# Patient Record
Sex: FEMALE | Race: BLACK OR AFRICAN AMERICAN | Employment: OTHER | URBAN - METROPOLITAN AREA
[De-identification: names, ages, dates, MRNs, and addresses within clinical notes are randomized per-mention and may not be internally consistent; named-entity substitution may affect disease eponyms.]

---

## 2017-01-01 ENCOUNTER — HOSPITAL ENCOUNTER (OUTPATIENT)
Dept: MAMMOGRAPHY | Age: 82
Discharge: HOME | End: 2017-06-22
Attending: INTERNAL MEDICINE
Payer: MEDICARE

## 2017-01-01 ENCOUNTER — HOSPITAL ENCOUNTER (OUTPATIENT)
Dept: LAB | Age: 82
Discharge: HOME | End: 2017-06-07
Payer: MEDICARE

## 2017-01-01 DIAGNOSIS — I10 HYPERTENSION, ESSENTIAL: ICD-10-CM

## 2017-01-01 DIAGNOSIS — C67.9 BLADDER CANCER (HCC): ICD-10-CM

## 2017-01-01 DIAGNOSIS — E78.00 PURE HYPERCHOLESTEROLEMIA: ICD-10-CM

## 2017-01-01 DIAGNOSIS — N18.2 CHRONIC KIDNEY DISEASE, STAGE II (MILD): ICD-10-CM

## 2017-01-01 DIAGNOSIS — Z12.31 VISIT FOR SCREENING MAMMOGRAM: ICD-10-CM

## 2017-01-01 DIAGNOSIS — I48.0 PAROXYSMAL ATRIAL FIBRILLATION (HCC): ICD-10-CM

## 2017-01-01 DIAGNOSIS — F01.53 VASCULAR DEMENTIA WITH DEPRESSED MOOD: ICD-10-CM

## 2017-01-01 LAB
ALBUMIN SERPL BCP-MCNC: 2.6 G/DL (ref 3.4–5)
ALBUMIN/GLOB SERPL: 0.6 {RATIO} (ref 0.8–1.7)
ALP SERPL-CCNC: 72 U/L (ref 45–117)
ALT SERPL-CCNC: 13 U/L (ref 13–56)
ANION GAP BLD CALC-SCNC: 10 MMOL/L (ref 3–18)
AST SERPL W P-5'-P-CCNC: 15 U/L (ref 15–37)
BASOPHILS # BLD AUTO: 0 K/UL (ref 0–0.1)
BASOPHILS # BLD: 1 % (ref 0–2)
BILIRUB SERPL-MCNC: 0.2 MG/DL (ref 0.2–1)
BUN SERPL-MCNC: 26 MG/DL (ref 7–18)
BUN/CREAT SERPL: 14 (ref 12–20)
CALCIUM SERPL-MCNC: 9 MG/DL (ref 8.5–10.1)
CHLORIDE SERPL-SCNC: 110 MMOL/L (ref 100–108)
CHOLEST SERPL-MCNC: 137 MG/DL
CO2 SERPL-SCNC: 22 MMOL/L (ref 21–32)
CREAT SERPL-MCNC: 1.85 MG/DL (ref 0.6–1.3)
DIFFERENTIAL METHOD BLD: ABNORMAL
EOSINOPHIL # BLD: 0.1 K/UL (ref 0–0.4)
EOSINOPHIL NFR BLD: 2 % (ref 0–5)
ERYTHROCYTE [DISTWIDTH] IN BLOOD BY AUTOMATED COUNT: 16.9 % (ref 11.6–14.5)
GLOBULIN SER CALC-MCNC: 4.6 G/DL (ref 2–4)
GLUCOSE SERPL-MCNC: 82 MG/DL (ref 74–99)
HCT VFR BLD AUTO: 39.5 % (ref 35–45)
HDLC SERPL-MCNC: 39 MG/DL (ref 40–60)
HDLC SERPL: 3.5 {RATIO} (ref 0–5)
HGB BLD-MCNC: 13.3 G/DL (ref 12–16)
LDLC SERPL CALC-MCNC: 65.8 MG/DL (ref 0–100)
LIPID PROFILE,FLP: ABNORMAL
LYMPHOCYTES # BLD AUTO: 48 % (ref 21–52)
LYMPHOCYTES # BLD: 3 K/UL (ref 0.9–3.6)
MCH RBC QN AUTO: 31.7 PG (ref 24–34)
MCHC RBC AUTO-ENTMCNC: 33.7 G/DL (ref 31–37)
MCV RBC AUTO: 94 FL (ref 74–97)
MONOCYTES # BLD: 0.4 K/UL (ref 0.05–1.2)
MONOCYTES NFR BLD AUTO: 7 % (ref 3–10)
NEUTS SEG # BLD: 2.6 K/UL (ref 1.8–8)
NEUTS SEG NFR BLD AUTO: 42 % (ref 40–73)
PLATELET # BLD AUTO: 326 K/UL (ref 135–420)
PMV BLD AUTO: 10.2 FL (ref 9.2–11.8)
POTASSIUM SERPL-SCNC: 4.7 MMOL/L (ref 3.5–5.5)
PROT SERPL-MCNC: 7.2 G/DL (ref 6.4–8.2)
RBC # BLD AUTO: 4.2 M/UL (ref 4.2–5.3)
SODIUM SERPL-SCNC: 142 MMOL/L (ref 136–145)
T4 SERPL-MCNC: 9.8 UG/DL (ref 4.5–10.9)
TRIGL SERPL-MCNC: 161 MG/DL (ref ?–150)
TSH SERPL DL<=0.05 MIU/L-ACNC: 1.55 UIU/ML (ref 0.36–3.74)
VLDLC SERPL CALC-MCNC: 32.2 MG/DL
WBC # BLD AUTO: 6.2 K/UL (ref 4.6–13.2)

## 2017-01-01 PROCEDURE — 85025 COMPLETE CBC W/AUTO DIFF WBC: CPT | Performed by: INTERNAL MEDICINE

## 2017-01-01 PROCEDURE — 36415 COLL VENOUS BLD VENIPUNCTURE: CPT | Performed by: INTERNAL MEDICINE

## 2017-01-01 PROCEDURE — 80061 LIPID PANEL: CPT | Performed by: INTERNAL MEDICINE

## 2017-01-01 PROCEDURE — 84443 ASSAY THYROID STIM HORMONE: CPT | Performed by: INTERNAL MEDICINE

## 2017-01-01 PROCEDURE — 80053 COMPREHEN METABOLIC PANEL: CPT | Performed by: INTERNAL MEDICINE

## 2017-01-01 PROCEDURE — 84436 ASSAY OF TOTAL THYROXINE: CPT | Performed by: INTERNAL MEDICINE

## 2017-01-01 PROCEDURE — 77067 SCR MAMMO BI INCL CAD: CPT

## 2017-05-22 ENCOUNTER — OFFICE VISIT (OUTPATIENT)
Dept: ORTHOPEDIC SURGERY | Facility: CLINIC | Age: 82
End: 2017-05-22

## 2017-05-22 VITALS
SYSTOLIC BLOOD PRESSURE: 130 MMHG | HEART RATE: 95 BPM | BODY MASS INDEX: 33.66 KG/M2 | DIASTOLIC BLOOD PRESSURE: 72 MMHG | TEMPERATURE: 97 F | HEIGHT: 65 IN | WEIGHT: 202 LBS

## 2017-05-22 DIAGNOSIS — I50.32 CHRONIC DIASTOLIC HEART FAILURE (HCC): ICD-10-CM

## 2017-05-22 DIAGNOSIS — M54.5 LOW BACK PAIN, UNSPECIFIED BACK PAIN LATERALITY, UNSPECIFIED CHRONICITY, WITH SCIATICA PRESENCE UNSPECIFIED: ICD-10-CM

## 2017-05-22 DIAGNOSIS — M25.461 KNEE EFFUSION, RIGHT: ICD-10-CM

## 2017-05-22 DIAGNOSIS — Z98.1 S/P CERVICAL SPINAL FUSION: ICD-10-CM

## 2017-05-22 DIAGNOSIS — M19.072 PRIMARY OSTEOARTHRITIS OF BOTH FEET: ICD-10-CM

## 2017-05-22 DIAGNOSIS — M25.561 PAIN IN BOTH KNEES, UNSPECIFIED CHRONICITY: ICD-10-CM

## 2017-05-22 DIAGNOSIS — M17.11 PRIMARY OSTEOARTHRITIS OF RIGHT KNEE: Primary | ICD-10-CM

## 2017-05-22 DIAGNOSIS — Z85.51 HX OF BLADDER CANCER: ICD-10-CM

## 2017-05-22 DIAGNOSIS — N18.30 CKD (CHRONIC KIDNEY DISEASE), STAGE III (HCC): ICD-10-CM

## 2017-05-22 DIAGNOSIS — R29.898 WEAKNESS OF BOTH LEGS: ICD-10-CM

## 2017-05-22 DIAGNOSIS — M25.562 PAIN IN BOTH KNEES, UNSPECIFIED CHRONICITY: ICD-10-CM

## 2017-05-22 DIAGNOSIS — G62.9 NEUROPATHY: ICD-10-CM

## 2017-05-22 DIAGNOSIS — M22.41 CHONDROMALACIA, PATELLA, RIGHT: ICD-10-CM

## 2017-05-22 DIAGNOSIS — R88.8 TURBIDITY OF BODY FLUID: ICD-10-CM

## 2017-05-22 DIAGNOSIS — M16.11 PRIMARY OSTEOARTHRITIS OF RIGHT HIP: ICD-10-CM

## 2017-05-22 DIAGNOSIS — M10.9 GOUT OF RIGHT KNEE, UNSPECIFIED CAUSE, UNSPECIFIED CHRONICITY: ICD-10-CM

## 2017-05-22 DIAGNOSIS — M17.12 PRIMARY OSTEOARTHRITIS OF LEFT KNEE: ICD-10-CM

## 2017-05-22 DIAGNOSIS — M19.071 PRIMARY OSTEOARTHRITIS OF BOTH FEET: ICD-10-CM

## 2017-05-22 RX ORDER — BETAMETHASONE SODIUM PHOSPHATE AND BETAMETHASONE ACETATE 3; 3 MG/ML; MG/ML
3 INJECTION, SUSPENSION INTRA-ARTICULAR; INTRALESIONAL; INTRAMUSCULAR; SOFT TISSUE ONCE
Qty: 0.5 ML | Refills: 0
Start: 2017-05-22 | End: 2017-05-22

## 2017-05-22 RX ORDER — HYDROCODONE BITARTRATE AND ACETAMINOPHEN 7.5; 325 MG/1; MG/1
1-2 TABLET ORAL
Qty: 60 TAB | Refills: 0 | Status: SHIPPED | OUTPATIENT
Start: 2017-05-22 | End: 2018-01-01 | Stop reason: SDUPTHER

## 2017-05-22 RX ORDER — BUPIVACAINE HYDROCHLORIDE 2.5 MG/ML
4 INJECTION, SOLUTION EPIDURAL; INFILTRATION; INTRACAUDAL ONCE
Qty: 4 ML | Refills: 0
Start: 2017-05-22 | End: 2017-05-22

## 2017-05-22 NOTE — PATIENT INSTRUCTIONS
Knee Arthritis: Exercises  Your Care Instructions  Here are some examples of exercises for knee arthritis. Start each exercise slowly. Ease off the exercise if you start to have pain. Your doctor or physical therapist will tell you when you can start these exercises and which ones will work best for you. How to do the exercises  Knee flexion with heel slide    1. Lie on your back with your knees bent. 2. Slide your heel back by bending your affected knee as far as you can. Then hook your other foot around your ankle to help pull your heel even farther back. 3. Hold for about 6 seconds, then rest for up to 10 seconds. 4. Repeat 8 to 12 times. 5. Switch legs and repeat steps 1 through 4, even if only one knee is sore. Quad sets    1. Sit with your affected leg straight and supported on the floor or a firm bed. Place a small, rolled-up towel under your knee. Your other leg should be bent, with that foot flat on the floor. 2. Tighten the thigh muscles of your affected leg by pressing the back of your knee down into the towel. 3. Hold for about 6 seconds, then rest for up to 10 seconds. 4. Repeat 8 to 12 times. 5. Switch legs and repeat steps 1 through 4, even if only one knee is sore. Straight-leg raises to the front    1. Lie on your back with your good knee bent so that your foot rests flat on the floor. Your affected leg should be straight. Make sure that your low back has a normal curve. You should be able to slip your hand in between the floor and the small of your back, with your palm touching the floor and your back touching the back of your hand. 2. Tighten the thigh muscles in your affected leg by pressing the back of your knee flat down to the floor. Hold your knee straight. 3. Keeping the thigh muscles tight and your leg straight, lift your affected leg up so that your heel is about 12 inches off the floor. Hold for about 6 seconds, then lower slowly.   4. Relax for up to 10 seconds between repetitions. 5. Repeat 8 to 12 times. 6. Switch legs and repeat steps 1 through 5, even if only one knee is sore. Active knee flexion    1. Lie on your stomach with your knees straight. If your kneecap is uncomfortable, roll up a washcloth and put it under your leg just above your kneecap. 2. Lift the foot of your affected leg by bending the knee so that you bring the foot up toward your buttock. If this motion hurts, try it without bending your knee quite as far. This may help you avoid any painful motion. 3. Slowly move your leg up and down. 4. Repeat 8 to 12 times. 5. Switch legs and repeat steps 1 through 4, even if only one knee is sore. Quadriceps stretch (facedown)    1. Lie flat on your stomach, and rest your face on the floor. 2. Wrap a towel or belt strap around the lower part of your affected leg. Then use the towel or belt strap to slowly pull your heel toward your buttock until you feel a stretch. 3. Hold for about 15 to 30 seconds, then relax your leg against the towel or belt strap. 4. Repeat 2 to 4 times. 5. Switch legs and repeat steps 1 through 4, even if only one knee is sore. Stationary exercise bike    If you do not have a stationary exercise bike at home, you can find one to ride at your local health club or community center. 1. Adjust the height of the bike seat so that your knee is slightly bent when your leg is extended downward. If your knee hurts when the pedal reaches the top, you can raise the seat so that your knee does not bend as much. 2. Start slowly. At first, try to do 5 to 10 minutes of cycling with little to no resistance. Then increase your time and the resistance bit by bit until you can do 20 to 30 minutes without pain. 3. If you start to have pain, rest your knee until your pain gets back to the level that is normal for you. Or cycle for less time or with less effort. Follow-up care is a key part of your treatment and safety.  Be sure to make and go to all appointments, and call your doctor if you are having problems. It's also a good idea to know your test results and keep a list of the medicines you take. Where can you learn more? Go to http://tyree-donovan.info/. Enter C159 in the search box to learn more about \"Knee Arthritis: Exercises. \"  Current as of: May 23, 2016  Content Version: 11.2  © 20060099-1841 [employer]. Care instructions adapted under license by [employer] (which disclaims liability or warranty for this information). If you have questions about a medical condition or this instruction, always ask your healthcare professional. [place] [address] any warranty or liability for your use of this information. Joint Injections: Care Instructions  Your Care Instructions  Joint injections are shots into a joint, such as the knee. They may be used to put in medicines, such as pain relievers. Or they can be used to take out fluid. Sometimes the fluid is tested in a lab. This can help find the cause of a joint problem. A corticosteroid, or steroid, shot is used to reduce inflammation in tendons or joints. It is often used to treat problems such as arthritis, tendinitis, and bursitis. Steroids can be injected directly into a painful, inflamed joint. They can also help reduce inflammation of a bursa. A bursa is a sac of fluid. It cushions and lubricates areas where tendons, ligaments, skin, muscles, or bones rub against each other. A steroid shot can sometimes help with short-term pain relief when other treatments haven't worked. If steroid shots help, pain may improve for weeks or months. Follow-up care is a key part of your treatment and safety. Be sure to make and go to all appointments, and call your doctor if you are having problems. It's also a good idea to know your test results and keep a list of the medicines you take. How can you care for yourself at home?   · Put ice or a cold pack on the area for 10 to 20 minutes at a time. Put a thin cloth between the ice and your skin. · Take anti-inflammatory medicines to reduce pain, swelling, or inflammation. These include ibuprofen (Advil, Motrin) and naproxen (Aleve). Read and follow all instructions on the label. · Avoid strenuous activities for several days, especially those that put stress on the area where you got the shot. · If you have dressings over the area, keep them clean and dry. You may remove them when your doctor tells you to. When should you call for help? Call your doctor now or seek immediate medical care if:  · You have signs of infection, such as:  ¨ Increased pain, swelling, warmth, or redness. ¨ Red streaks leading from the site. ¨ Pus draining from the site. ¨ A fever. Watch closely for changes in your health, and be sure to contact your doctor if you have any problems. Where can you learn more? Go to http://tyreeEasyclass.comdonovan.info/. Enter N616 in the search box to learn more about \"Joint Injections: Care Instructions. \"  Current as of: May 23, 2016  Content Version: 11.2  © 1374-1772 [employer]. Care instructions adapted under license by [employer] (which disclaims liability or warranty for this information). If you have questions about a medical condition or this instruction, always ask your healthcare professional. [address] any warranty or liability for your use of this information. Gout: Care Instructions  Your Care Instructions  Gout is a form of arthritis caused by a buildup of uric acid crystals in a joint. It causes sudden attacks of pain, swelling, redness, and stiffness, usually in one joint, especially the big toe. Gout usually comes on without a cause. But it can be brought on by drinking alcohol (especially beer) or eating seafood and red meat.  Taking certain medicines, such as diuretics or aspirin, also can bring on an attack of gout.  Taking your medicines as prescribed and following up with your doctor regularly can help you avoid gout attacks in the future. Follow-up care is a key part of your treatment and safety. Be sure to make and go to all appointments, and call your doctor if you are having problems. Its also a good idea to know your test results and keep a list of the medicines you take. How can you care for yourself at home? · If the joint is swollen, put ice or a cold pack on the area for 10 to 20 minutes at a time. Put a thin cloth between the ice and your skin. · Prop up the sore limb on a pillow when you ice it or anytime you sit or lie down during the next 3 days. Try to keep it above the level of your heart. This will help reduce swelling. · Rest sore joints. Avoid activities that put weight or strain on the joints for a few days. Take short rest breaks from your regular activities during the day. · Take your medicines exactly as prescribed. Call your doctor if you think you are having a problem with your medicine. · Take pain medicines exactly as directed. ¨ If the doctor gave you a prescription medicine for pain, take it as prescribed. ¨ If you are not taking a prescription pain medicine, ask your doctor if you can take an over-the-counter medicine. · Eat less seafood and red meat. · Check with your doctor before drinking alcohol. · Losing weight, if you are overweight, may help reduce attacks of gout. But do not go on a [employer]. \" Losing a lot of weight in a short amount of time can cause a gout attack. When should you call for help? Call your doctor now or seek immediate medical care if:  · You have a fever. · The joint is so painful you cannot use it. · You have sudden, unexplained swelling, redness, warmth, or severe pain in one or more joints. Watch closely for changes in your health, and be sure to contact your doctor if:  · You have joint pain.   · Your symptoms get worse or are not improving after 2 or 3 days. Where can you learn more? Go to http://tyree-donovan.info/. Enter R988 in the search box to learn more about \"Gout: Care Instructions. \"  Current as of: October 31, 2016  Content Version: 11.2  © 0764-5823 [employer]. Care instructions adapted under license by [employer] (which disclaims liability or warranty for this information). If you have questions about a medical condition or this instruction, always ask your healthcare professional. [address] any warranty or liability for your use of this information.

## 2017-05-22 NOTE — PROGRESS NOTES
Patient: Niru Philippe                MRN: [id number]       SSN: [id number]  YOB: 1935        AGE: [de-identified] y.o. SEX: female    PCP: Marlon Espinosa MD  05/22/17    Chief Complaint   Patient presents with    Knee Pain     Right     HISTORY:  Niru Philippe is a [de-identified] y.o. female who is seen for bilateral knee (R>L) and bilateral foot pain. She notes that she was seen at the ED on 8/18/16 for increased knee and foot pain. She experiences pain with walking and standing. She responded to a previous Euflexxa series in 2012. She notes pain in her right hip and both knees. She denies any previous injury or trauma. She has been seen by Dr. Uriel Santos in the past, and underwent cervical fusion about 10 years ago. She notes pain radiating from her back into her right leg. She was previously seen by Dr. Roger Sarmiento who provided hip and back injections. MRI scan ordered by Dr. Roger Sarmiento revealed no surgical pathology. Lumbar MRI scan ordered by Dr. Ureil Santos revealed spondylitic changes and foraminal stenosis. She reports a h/o gout. She responded significantly to a right knee aspiration last OV-- positive for uric acid crystals. She notes that her right knee pain flared up about 2 weeks ago. She reports a hot sensation in her right knee and anterior knee aching. She states that her knee talks back to her with the crackling sensations. She walks using a four-post walker. She reports that the abrasions on her right knee were from when she was younger and fell off the porch while she was drunk. Pain Assessment  5/22/2017   Location of Pain Knee   Location Modifiers Right   Severity of Pain 10   Quality of Pain Throbbing;Aching   Duration of Pain Persistent   Frequency of Pain Intermittent   Aggravating Factors Walking;Standing;Bending   Limiting Behavior Yes   Relieving Factors Nothing   Result of Injury No     Occupation, etc:  Ms. Andre Peguero previously worked as a  at the [employer].   She lives in [place] with her son. She smokes cigarettes. She is not diabetic. She reports a h/o neuropathy for the past 6 years. Her son accompanies her to the office today. She takes aspirin regularly. Current weight is 202 pounds. She is 5'5\" tall. She reports a h/o neuropathy in her feet for which she takes Neurontin. She reports a h/o bladder cancer which caused her to not be able to tolerate a lumbar orthosis. She lives with her son and brother who has COPD in [place]. Weight Metrics 5/22/2017 9/26/2016 9/23/2016 8/18/2016 1/3/2016 4/24/2015 10/24/2014   Weight 202 lb 209 lb 208 lb 208 lb 208 lb 202 lb 200 lb   BMI 33.61 kg/m2 34.78 kg/m2 34.61 kg/m2 34.61 kg/m2 34.61 kg/m2 35.79 kg/m2 35.44 kg/m2     Patient Active Problem List   Diagnosis Code    Hypertension I10    AF (atrial fibrillation) ([facility]) I48.91    Depression F32.9    IBS (irritable bowel syndrome) K58.9    Hx of bladder cancer Z85.51    Attention to urostomy ([facility] [address] Z43.6    Unstable angina ([facility]) I20.0    CKD (chronic kidney disease), stage III N18.3    Chronic diastolic heart failure ([facility]) I50.32    Cervical radiculopathy M54.12    BMI 33.0-33.9,adult Z68.33     REVIEW OF SYSTEMS: All Below are Negative except: See HPI   Constitutional: negative for fever, chills, and weight loss. Cardiovascular: negative for chest pain, claudication, leg swelling, SOB, MOBLEY   Gastrointestinal: Negative for pain, N/V/C/D, Blood in stool or urine, dysuria,  hematuria, incontinence, pelvic pain. Musculoskeletal: See HPI   Neurological: Negative for dizziness and weakness. Negative for headaches, Visual changes, confusion, seizures   Phychiatric/Behavioral: Negative for depression, memory loss, substance  abuse. Extremities: Negative for hair changes, rash, or skin lesion changes.    Hematologic: Negative for bleeding problems, bruising, pallor or swollen lymph  nodes   Peripheral Vascular: No calf pain, no circulation deficits. Social History     Social History    Marital status:      Spouse name: N/A    Number of children: N/A    Years of education: N/A     Occupational History    Not on file. Social History Main Topics    Smoking status: Current Every Day Smoker     Packs/day: 0.50    Smokeless tobacco: Never Used    Alcohol use No    Drug use: No    Sexual activity: Not on file     Other Topics Concern    Not on file     Social History Narrative      Allergies   Allergen Reactions    Dairy-Aid Unknown (comments)     Dairy products      Lyrica [Pregabalin] Other (comments)     lightheaded    Vicodin [Hydrocodone-Acetaminophen] Nausea Only      Current Outpatient Prescriptions   Medication Sig    valsartan-hydrochlorothiazide (DIOVAN-HCT) 80-12.5 mg per tablet Take 1 Tab by mouth daily.  gabapentin (NEURONTIN) 300 mg capsule Take 300 mg by mouth.  aspirin 81 mg chewable tablet Take 1 Tab by mouth daily.  b complex-vitamin c-folic acid (NEPHROCAPS) 1 mg capsule Take 1 Cap by mouth daily.  HYDROcodone-acetaminophen (NORCO) 7.5-325 mg per tablet Take 1-2 Tabs by mouth nightly as needed for Pain. Max Daily Amount: 2 Tabs.  traMADol (ULTRAM) 50 mg tablet Take 1 Tab by mouth every six (6) hours as needed for Pain. Max Daily Amount: 200 mg.  predniSONE (STERAPRED) 5 mg dose pack See administration instruction per 5mg dose pack    docusate sodium (COLACE) 100 mg capsule Take 1 capsule by mouth 2 times a day for constipation. No current facility-administered medications for this visit.        PHYSICAL EXAMINATION:  Visit Vitals    /72    Pulse 95    Temp 97 °F (36.1 °C) (Oral)    Ht 5' 5\" (1.651 m)    Wt 202 lb (91.6 kg)    BMI 33.61 kg/m2     ORTHO EXAMINATION:  Examination Right knee Left knee   Skin Intact, two well-healed 1-inch diameter abrasions Intact   Range of motion 85-5 115-0   Effusion 1+ -   Medial joint line tenderness + +   Lateral joint line tenderness - - Popliteal tenderness - -   Osteophytes palpable + +   Kenjis - -   Patella crepitus + +   Anterior drawer - -   Lateral laxity - -   Medial laxity - -   Varus deformity - -   Valgus deformity - -   Pretibial edema - -   Calf tenderness - -   Ambulates using a four-post walker    Examination Right Ankle/Foot Left Ankle/Foot   Skin Intact Intact   Swelling - -   Dorsiflexion 10 10   Plantarflexion 25 25   Deformity - -   Inversion laxity - -   Anterior drawer - -   Medial tenderness - -   Lateral tenderness - -   Heel cord Intact Intact   Sensation Intact Intact   Bunion - -   Toe nails Normal Normal   Capillary refill Normal Normal     PROCEDURES:  After discussing treatment options, patient's right knee and paralumbar region were injected with 4 cc Marcaine and 1/2 cc Celestone. Chart reviewed for the following:   Polina Montana MD, have reviewed the History, Physical and updated the Allergic reactions for [address] performed immediately prior to start of procedure:  Polina Montana MD, have performed the following reviews on [employer] prior to the start of the procedure:            * Patient was identified by name and date of birth   * Agreement on procedure being performed was verified  * Risks and Benefits explained to the patient  * Procedure site verified and marked as necessary  * Patient was positioned for comfort  * Consent was obtained     Time: 10:29 AM     Date of procedure: 5/22/2017    Procedure performed by:  Chula Morrison MD    Ms. Watt tolerated the procedure well with no complications. LABS:  CULTURE, BODY FLUID W GRAM STAIN 9/23/16  RESULT:  No growth 3 days, no organisms seen, many WBC's. CRYSTALS, SYNOVIAL FLUID 9/23/16  RESULT:  Positive, Monosodium urate crystals observed.     EMG/NCV BY DR. 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 There eis no electrophysiologic evidence of entrapment neuropathy or lumbosacral radiculopathy per se. Please note that due to the nature of her peripheral neuropathy a lumbosacral radiculopathy cannot adequately be ruled out. As to evaluating her peripheral neuropathy, furthe revaluation include checking KETURAH, ESR, TSH, B12, folate, as well as hemoglobin A1c may be helpful. RADIOGRAPHS:  XR LEFT FOOT 8/18/16  IMPRESSION:  1. 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 2. Deformity of the distal end of the proximal phalanx of the left 4th toe appears chronic. XR LEFT ANKLE 8/18/16  IMPRESSION:  Negative ankle. XR RIGHT FOOT 5/7/16  IMPRESSION:  Degenerative arthritis of the first metatarsophalangeal joint. XR HARRIETT KNEES 12/3/13  IMPRESSION:  Three views - No fractures, no effusion, moderate medial joint space narrowing, no osteophytes present. IMPRESSION:      ICD-10-CM ICD-9-CM    1. Primary osteoarthritis of right knee M17.11 715.16 betamethasone (CELESTONE SOLUSPAN) 6 mg/mL injection      BETAMETHASONE ACETATE & SODIUM PHOSPHATE INJECTION 3 MG EA.      DRAIN/INJECT LARGE JOINT/BURSA      bupivacaine, PF, (MARCAINE, PF,) 0.25 % (2.5 mg/mL) injection      AMB SUPPLY ORDER      REFERRAL TO PHYSICAL THERAPY      HYDROcodone-acetaminophen (NORCO) 7.5-325 mg per tablet    Moderately, medial compartment   2. 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      DRAIN/INJECT LARGE JOINT/BURSA      bupivacaine, PF, (MARCAINE, PF,) 0.25 % (2.5 mg/mL) injection      AMB SUPPLY ORDER      REFERRAL TO PHYSICAL THERAPY      HYDROcodone-acetaminophen (NORCO) 7.5-325 mg per tablet   3. Primary osteoarthritis of left knee M17.12 715.16 AMB SUPPLY ORDER      REFERRAL TO PHYSICAL THERAPY      HYDROcodone-acetaminophen (NORCO) 7.5-325 mg per tablet   4.  Pain in both knees, unspecified chronicity M25.561 719.46 AMB SUPPLY ORDER    M25.562  REFERRAL TO PHYSICAL THERAPY      HYDROcodone-acetaminophen (NORCO) 7.5-325 mg per tablet   5. Primary osteoarthritis of right hip M16.11 715.15 AMB SUPPLY ORDER      HYDROcodone-acetaminophen (NORCO) 7.5-325 mg per tablet   6. Gout of right knee, unspecified cause, unspecified chronicity M10.9 274.9 betamethasone (CELESTONE SOLUSPAN) 6 mg/mL injection      BETAMETHASONE ACETATE & SODIUM PHOSPHATE INJECTION 3 MG EA.      DRAIN/INJECT LARGE JOINT/BURSA      bupivacaine, PF, (MARCAINE, PF,) 0.25 % (2.5 mg/mL) injection      AMB SUPPLY ORDER      REFERRAL TO PHYSICAL THERAPY      HYDROcodone-acetaminophen (NORCO) 7.5-325 mg per tablet   7. Primary osteoarthritis of both feet M19.071 715.17 HYDROcodone-acetaminophen (NORCO) 7.5-325 mg per tablet    M19.072     8. Knee effusion, right M25.461 719.06 HYDROcodone-acetaminophen (NORCO) 7.5-325 mg per tablet   9. Turbidity of body fluid R88.8 792.9 HYDROcodone-acetaminophen (NORCO) 7.5-325 mg per tablet   10. BMI 33.0-33.9,adult Z68.33 V85.33 HYDROcodone-acetaminophen (NORCO) 7.5-325 mg per tablet   11. Neuropathy G62.9 355.9 HYDROcodone-acetaminophen (NORCO) 7.5-325 mg per tablet   12. CKD (chronic kidney disease), stage III N18.3 585.3 HYDROcodone-acetaminophen (NORCO) 7.5-325 mg per tablet   13. Chronic diastolic heart failure (HCC) I50.32 428.32 HYDROcodone-acetaminophen (NORCO) 7.5-325 mg per tablet   14. Hx of bladder cancer Z85.51 V10.51 HYDROcodone-acetaminophen (NORCO) 7.5-325 mg per tablet   15. S/P cervical spinal fusion Z98.1 V45.4 REFERRAL TO SPINE SURGERY      REFERRAL TO PHYSICAL THERAPY      HYDROcodone-acetaminophen (NORCO) 7.5-325 mg per tablet   16. Weakness of both legs R29.898 729.89 REFERRAL TO SPINE SURGERY      HYDROcodone-acetaminophen (NORCO) 7.5-325 mg per tablet   17.  Low back pain, unspecified back pain laterality, unspecified chronicity, with sciatica presence unspecified M54.5 724.2 REFERRAL TO SPINE SURGERY      REFERRAL TO PHYSICAL THERAPY      betamethasone (CELESTONE SOLUSPAN) 6 mg/mL injection      BETAMETHASONE ACETATE & SODIUM PHOSPHATE INJECTION 3 MG EA. THER/PROPH/DIAG INJECTION, SUBCUT/IM      bupivacaine, PF, (MARCAINE, PF,) 0.25 % (2.5 mg/mL) injection      HYDROcodone-acetaminophen (NORCO) 7.5-325 mg per tablet     PLAN:  After discussing treatment options, patient's right knee and paralumbar region were injected with 4 cc Marcaine and 1/2 cc Celestone. She will follow up as needed. She will follow up with her PCP for possible Allopurinol prescription. She was provided with a prescription to add wheels to her walker today. She will follow up at the spine center if radicular neck and low back pain continues. She will take Norco for the pain as needed at night.       Scribed by [employer] ([address]) as dictated by Gab Appiah MD

## 2018-01-01 ENCOUNTER — APPOINTMENT (OUTPATIENT)
Dept: CT IMAGING | Age: 83
DRG: 064 | End: 2018-01-01
Attending: FAMILY MEDICINE
Payer: MEDICARE

## 2018-01-01 ENCOUNTER — TELEPHONE (OUTPATIENT)
Dept: CARDIOLOGY CLINIC | Age: 83
End: 2018-01-01

## 2018-01-01 ENCOUNTER — OFFICE VISIT (OUTPATIENT)
Dept: CARDIOLOGY CLINIC | Age: 83
End: 2018-01-01

## 2018-01-01 ENCOUNTER — HOSPITAL ENCOUNTER (OUTPATIENT)
Dept: CT IMAGING | Age: 83
Discharge: HOME | End: 2018-01-25
Attending: OBSTETRICS & GYNECOLOGY
Payer: MEDICARE

## 2018-01-01 ENCOUNTER — CLINICAL SUPPORT (OUTPATIENT)
Dept: CARDIOLOGY CLINIC | Age: 83
End: 2018-01-01

## 2018-01-01 ENCOUNTER — APPOINTMENT (OUTPATIENT)
Dept: GENERAL RADIOLOGY | Age: 83
DRG: 064 | End: 2018-01-01
Attending: INTERNAL MEDICINE
Payer: MEDICARE

## 2018-01-01 ENCOUNTER — APPOINTMENT (OUTPATIENT)
Dept: CT IMAGING | Age: 83
End: 2018-01-01
Attending: EMERGENCY MEDICINE
Payer: MEDICARE

## 2018-01-01 ENCOUNTER — HOSPITAL ENCOUNTER (EMERGENCY)
Age: 83
Discharge: OTHER INSTITUTION | End: 2018-05-28
Attending: EMERGENCY MEDICINE | Admitting: EMERGENCY MEDICINE
Payer: MEDICARE

## 2018-01-01 ENCOUNTER — APPOINTMENT (OUTPATIENT)
Dept: GENERAL RADIOLOGY | Age: 83
End: 2018-01-01
Attending: EMERGENCY MEDICINE
Payer: MEDICARE

## 2018-01-01 ENCOUNTER — HOSPITAL ENCOUNTER (INPATIENT)
Age: 83
LOS: 1 days | DRG: 064 | End: 2018-05-29
Attending: EMERGENCY MEDICINE | Admitting: FAMILY MEDICINE
Payer: MEDICARE

## 2018-01-01 ENCOUNTER — APPOINTMENT (OUTPATIENT)
Dept: GENERAL RADIOLOGY | Age: 83
DRG: 064 | End: 2018-01-01
Attending: FAMILY MEDICINE
Payer: MEDICARE

## 2018-01-01 VITALS
TEMPERATURE: 97.4 F | DIASTOLIC BLOOD PRESSURE: 50 MMHG | OXYGEN SATURATION: 76 % | SYSTOLIC BLOOD PRESSURE: 98 MMHG | BODY MASS INDEX: 36.18 KG/M2 | WEIGHT: 217.4 LBS

## 2018-01-01 VITALS
HEART RATE: 87 BPM | DIASTOLIC BLOOD PRESSURE: 75 MMHG | OXYGEN SATURATION: 100 % | TEMPERATURE: 95.8 F | RESPIRATION RATE: 21 BRPM | SYSTOLIC BLOOD PRESSURE: 180 MMHG

## 2018-01-01 VITALS
DIASTOLIC BLOOD PRESSURE: 74 MMHG | BODY MASS INDEX: 35.65 KG/M2 | HEIGHT: 65 IN | WEIGHT: 214 LBS | HEART RATE: 106 BPM | SYSTOLIC BLOOD PRESSURE: 131 MMHG

## 2018-01-01 VITALS
DIASTOLIC BLOOD PRESSURE: 86 MMHG | BODY MASS INDEX: 34.66 KG/M2 | HEIGHT: 65 IN | WEIGHT: 208 LBS | HEART RATE: 95 BPM | SYSTOLIC BLOOD PRESSURE: 134 MMHG

## 2018-01-01 DIAGNOSIS — I48.0 PAROXYSMAL ATRIAL FIBRILLATION (HCC): ICD-10-CM

## 2018-01-01 DIAGNOSIS — G62.9 NEUROPATHY: ICD-10-CM

## 2018-01-01 DIAGNOSIS — I50.32 CHRONIC DIASTOLIC HEART FAILURE (HCC): Primary | ICD-10-CM

## 2018-01-01 DIAGNOSIS — I48.0 PAROXYSMAL ATRIAL FIBRILLATION (HCC): Primary | ICD-10-CM

## 2018-01-01 DIAGNOSIS — I50.32 CHRONIC DIASTOLIC HEART FAILURE (HCC): ICD-10-CM

## 2018-01-01 DIAGNOSIS — R19.7 DIARRHEA: ICD-10-CM

## 2018-01-01 DIAGNOSIS — Z85.51 HISTORY OF BLADDER CANCER: ICD-10-CM

## 2018-01-01 DIAGNOSIS — Z79.01 ANTICOAGULANT LONG-TERM USE: ICD-10-CM

## 2018-01-01 DIAGNOSIS — I10 ESSENTIAL HYPERTENSION: ICD-10-CM

## 2018-01-01 DIAGNOSIS — Z79.01 ANTICOAGULATED ON COUMADIN: ICD-10-CM

## 2018-01-01 DIAGNOSIS — I61.9 RIGHT-SIDED NONTRAUMATIC INTRACEREBRAL HEMORRHAGE, UNSPECIFIED CEREBRAL LOCATION (HCC): Primary | ICD-10-CM

## 2018-01-01 DIAGNOSIS — R19.5 OCCULT BLOOD IN STOOLS: ICD-10-CM

## 2018-01-01 DIAGNOSIS — I62.9 INTRACRANIAL HEMORRHAGE (HCC): Primary | ICD-10-CM

## 2018-01-01 DIAGNOSIS — N18.30 CKD (CHRONIC KIDNEY DISEASE), STAGE III (HCC): ICD-10-CM

## 2018-01-01 LAB
ABO + RH BLD: NORMAL
ABO + RH BLD: NORMAL
ALBUMIN SERPL-MCNC: 2.5 G/DL (ref 3.4–5)
ALBUMIN SERPL-MCNC: 2.9 G/DL (ref 3.4–5)
ALBUMIN/GLOB SERPL: 0.7 {RATIO} (ref 0.8–1.7)
ALBUMIN/GLOB SERPL: 0.8 {RATIO} (ref 0.8–1.7)
ALP SERPL-CCNC: 80 U/L (ref 45–117)
ALP SERPL-CCNC: 85 U/L (ref 45–117)
ALT SERPL-CCNC: 15 U/L (ref 13–56)
ALT SERPL-CCNC: 17 U/L (ref 13–56)
AMMONIA PLAS-SCNC: 43 UMOL/L (ref 11–32)
AMPHET UR QL SCN: NEGATIVE
ANION GAP SERPL CALC-SCNC: 10 MMOL/L (ref 3–18)
ANION GAP SERPL CALC-SCNC: 11 MMOL/L (ref 3–18)
ANION GAP SERPL CALC-SCNC: 14 MMOL/L (ref 3–18)
APPEARANCE UR: ABNORMAL
APTT PPP: 44.3 SEC (ref 23–36.4)
APTT PPP: 65.9 SEC (ref 23–36.4)
APTT PPP: 92.6 SEC (ref 23–36.4)
ARTERIAL PATENCY WRIST A: ABNORMAL
ARTERIAL PATENCY WRIST A: YES
AST SERPL-CCNC: 23 U/L (ref 15–37)
AST SERPL-CCNC: 28 U/L (ref 15–37)
ATRIAL RATE: 104 BPM
ATRIAL RATE: 122 BPM
ATRIAL RATE: 81 BPM
ATRIAL RATE: 97 BPM
BACTERIA URNS QL MICRO: ABNORMAL /HPF
BARBITURATES UR QL SCN: NEGATIVE
BASE DEFICIT BLD-SCNC: 4 MMOL/L
BASE DEFICIT BLD-SCNC: 6 MMOL/L
BASE DEFICIT BLD-SCNC: 8 MMOL/L
BASE DEFICIT BLD-SCNC: 8 MMOL/L
BASE DEFICIT BLD-SCNC: 9 MMOL/L
BASE DEFICIT BLD-SCNC: 9 MMOL/L
BASOPHILS # BLD: 0 K/UL (ref 0–0.06)
BASOPHILS # BLD: 0 K/UL (ref 0–0.1)
BASOPHILS NFR BLD: 0 % (ref 0–2)
BASOPHILS NFR BLD: 0 % (ref 0–2)
BDY SITE: ABNORMAL
BENZODIAZ UR QL: NEGATIVE
BILIRUB DIRECT SERPL-MCNC: 0.2 MG/DL (ref 0–0.2)
BILIRUB SERPL-MCNC: 0.4 MG/DL (ref 0.2–1)
BILIRUB SERPL-MCNC: 0.6 MG/DL (ref 0.2–1)
BILIRUB UR QL: NEGATIVE
BLD PROD TYP BPU: NORMAL
BLD PROD TYP BPU: NORMAL
BLOOD BANK CMNT PATIENT-IMP: NORMAL
BLOOD GROUP ANTIBODIES SERPL: NORMAL
BODY TEMPERATURE: 96.9
BODY TEMPERATURE: 97.4
BODY TEMPERATURE: 97.5
BPU ID: NORMAL
BPU ID: NORMAL
BUN SERPL-MCNC: 26 MG/DL (ref 7–18)
BUN SERPL-MCNC: 27 MG/DL (ref 7–18)
BUN SERPL-MCNC: 29 MG/DL (ref 7–18)
BUN/CREAT SERPL: 10 (ref 12–20)
BUN/CREAT SERPL: 11 (ref 12–20)
BUN/CREAT SERPL: 9 (ref 12–20)
CA-I SERPL-SCNC: 0.95 MMOL/L (ref 1.12–1.32)
CALCIUM SERPL-MCNC: 8.1 MG/DL (ref 8.5–10.1)
CALCIUM SERPL-MCNC: 8.4 MG/DL (ref 8.5–10.1)
CALCIUM SERPL-MCNC: 8.4 MG/DL (ref 8.5–10.1)
CALCULATED P AXIS, ECG09: 66 DEGREES
CALCULATED R AXIS, ECG10: 46 DEGREES
CALCULATED R AXIS, ECG10: 49 DEGREES
CALCULATED R AXIS, ECG10: 51 DEGREES
CALCULATED R AXIS, ECG10: 59 DEGREES
CALCULATED T AXIS, ECG11: 26 DEGREES
CALCULATED T AXIS, ECG11: 46 DEGREES
CALCULATED T AXIS, ECG11: 54 DEGREES
CALCULATED T AXIS, ECG11: 58 DEGREES
CALLED TO:,BCALL1: NORMAL
CANNABINOIDS UR QL SCN: NEGATIVE
CHLORIDE SERPL-SCNC: 107 MMOL/L (ref 100–108)
CHLORIDE SERPL-SCNC: 110 MMOL/L (ref 100–108)
CHLORIDE SERPL-SCNC: 111 MMOL/L (ref 100–108)
CHOLEST SERPL-MCNC: 142 MG/DL
CK MB CFR SERPL CALC: 0.9 % (ref 0–4)
CK MB SERPL-MCNC: 1 NG/ML (ref 5–25)
CK SERPL-CCNC: 116 U/L (ref 26–192)
CO2 SERPL-SCNC: 19 MMOL/L (ref 21–32)
CO2 SERPL-SCNC: 20 MMOL/L (ref 21–32)
CO2 SERPL-SCNC: 21 MMOL/L (ref 21–32)
COCAINE UR QL SCN: NEGATIVE
COLOR UR: YELLOW
CORTIS SERPL-MCNC: 8.3 UG/DL (ref 3.09–22.4)
CREAT SERPL-MCNC: 2.28 MG/DL (ref 0.6–1.3)
CREAT SERPL-MCNC: 2.66 MG/DL (ref 0.6–1.3)
CREAT SERPL-MCNC: 3.2 MG/DL (ref 0.6–1.3)
CREAT UR-MCNC: 2.4 MG/DL (ref 0.6–1.3)
CRP SERPL HS-MCNC: 19.13 MG/L (ref 0–3)
DIAGNOSIS, 93000: NORMAL
DIFFERENTIAL METHOD BLD: ABNORMAL
DIFFERENTIAL METHOD BLD: ABNORMAL
EOSINOPHIL # BLD: 0.2 K/UL (ref 0–0.4)
EOSINOPHIL # BLD: 0.4 K/UL (ref 0–0.4)
EOSINOPHIL NFR BLD: 2 % (ref 0–5)
EOSINOPHIL NFR BLD: 4 % (ref 0–5)
ERYTHROCYTE [DISTWIDTH] IN BLOOD BY AUTOMATED COUNT: 16.6 % (ref 11.6–14.5)
ERYTHROCYTE [DISTWIDTH] IN BLOOD BY AUTOMATED COUNT: 17.3 % (ref 11.6–14.5)
ERYTHROCYTE [DISTWIDTH] IN BLOOD BY AUTOMATED COUNT: 17.6 % (ref 11.6–14.5)
ERYTHROCYTE [SEDIMENTATION RATE] IN BLOOD: >140 MM/HR (ref 0–30)
EST. AVERAGE GLUCOSE BLD GHB EST-MCNC: 114 MG/DL
GAS FLOW.O2 O2 DELIVERY SYS: ABNORMAL L/MIN
GAS FLOW.O2 SETTING OXYMISER: 12 BPM
GAS FLOW.O2 SETTING OXYMISER: 14 BPM
GAS FLOW.O2 SETTING OXYMISER: 14 BPM
GLOBULIN SER CALC-MCNC: 3.4 G/DL (ref 2–4)
GLOBULIN SER CALC-MCNC: 3.8 G/DL (ref 2–4)
GLUCOSE BLD STRIP.AUTO-MCNC: 118 MG/DL (ref 70–110)
GLUCOSE BLD STRIP.AUTO-MCNC: 128 MG/DL (ref 70–110)
GLUCOSE BLD STRIP.AUTO-MCNC: 133 MG/DL (ref 70–110)
GLUCOSE BLD STRIP.AUTO-MCNC: 165 MG/DL (ref 70–110)
GLUCOSE SERPL-MCNC: 118 MG/DL (ref 74–99)
GLUCOSE SERPL-MCNC: 130 MG/DL (ref 74–99)
GLUCOSE SERPL-MCNC: 166 MG/DL (ref 74–99)
GLUCOSE UR STRIP.AUTO-MCNC: NEGATIVE MG/DL
HBA1C MFR BLD: 5.6 % (ref 4.2–5.6)
HCO3 BLD-SCNC: 17 MMOL/L (ref 22–26)
HCO3 BLD-SCNC: 17.9 MMOL/L (ref 22–26)
HCO3 BLD-SCNC: 20.7 MMOL/L (ref 22–26)
HCO3 BLD-SCNC: 20.9 MMOL/L (ref 22–26)
HCO3 BLD-SCNC: 21.1 MMOL/L (ref 22–26)
HCO3 BLD-SCNC: 21.8 MMOL/L (ref 22–26)
HCT VFR BLD AUTO: 24.5 % (ref 35–45)
HCT VFR BLD AUTO: 27.1 % (ref 35–45)
HCT VFR BLD AUTO: 33.5 % (ref 35–45)
HDLC SERPL-MCNC: 47 MG/DL (ref 40–60)
HDLC SERPL: 3 {RATIO} (ref 0–5)
HDSCOM,HDSCOM: NORMAL
HGB BLD-MCNC: 11.6 G/DL (ref 12–16)
HGB BLD-MCNC: 8.1 G/DL (ref 12–16)
HGB BLD-MCNC: 9.2 G/DL (ref 12–16)
HGB UR QL STRIP: ABNORMAL
INR PPP: 2.1 (ref 0.8–1.2)
INR PPP: 2.5 (ref 0.8–1.2)
INR PPP: 6.6 (ref 0.8–1.2)
INSPIRATION.DURATION SETTING TIME VENT: 1.25 SEC
KETONES UR QL STRIP.AUTO: NEGATIVE MG/DL
LDLC SERPL CALC-MCNC: 55.4 MG/DL (ref 0–100)
LEUKOCYTE ESTERASE UR QL STRIP.AUTO: ABNORMAL
LIPASE SERPL-CCNC: 212 U/L (ref 73–393)
LIPID PROFILE,FLP: ABNORMAL
LYMPHOCYTES # BLD: 1.5 K/UL (ref 0.9–3.6)
LYMPHOCYTES # BLD: 4.6 K/UL (ref 0.9–3.6)
LYMPHOCYTES NFR BLD: 20 % (ref 21–52)
LYMPHOCYTES NFR BLD: 55 % (ref 21–52)
MAGNESIUM SERPL-MCNC: 1.8 MG/DL (ref 1.6–2.6)
MAGNESIUM SERPL-MCNC: 2 MG/DL (ref 1.6–2.6)
MCH RBC QN AUTO: 30.9 PG (ref 24–34)
MCH RBC QN AUTO: 31.6 PG (ref 24–34)
MCH RBC QN AUTO: 32.1 PG (ref 24–34)
MCHC RBC AUTO-ENTMCNC: 33.1 G/DL (ref 31–37)
MCHC RBC AUTO-ENTMCNC: 33.9 G/DL (ref 31–37)
MCHC RBC AUTO-ENTMCNC: 34.6 G/DL (ref 31–37)
MCV RBC AUTO: 91.3 FL (ref 74–97)
MCV RBC AUTO: 93.5 FL (ref 74–97)
MCV RBC AUTO: 94.4 FL (ref 74–97)
METHADONE UR QL: NEGATIVE
MONOCYTES # BLD: 0.8 K/UL (ref 0.05–1.2)
MONOCYTES # BLD: 0.8 K/UL (ref 0.05–1.2)
MONOCYTES NFR BLD: 10 % (ref 3–10)
MONOCYTES NFR BLD: 9 % (ref 3–10)
NEUTS SEG # BLD: 2.7 K/UL (ref 1.8–8)
NEUTS SEG # BLD: 5.3 K/UL (ref 1.8–8)
NEUTS SEG NFR BLD: 32 % (ref 40–73)
NEUTS SEG NFR BLD: 68 % (ref 40–73)
NITRITE UR QL STRIP.AUTO: NEGATIVE
O2/TOTAL GAS SETTING VFR VENT: 1 %
O2/TOTAL GAS SETTING VFR VENT: 100 %
O2/TOTAL GAS SETTING VFR VENT: 35 %
O2/TOTAL GAS SETTING VFR VENT: 35 %
OPIATES UR QL: NEGATIVE
OSMOLALITY SERPL: 334 MOSM/KG H2O (ref 280–300)
OSMOLALITY SERPL: 350 MOSM/KG H2O (ref 280–300)
PCO2 BLD: 28.2 MMHG (ref 35–45)
PCO2 BLD: 36.4 MMHG (ref 35–45)
PCO2 BLD: 36.8 MMHG (ref 35–45)
PCO2 BLD: 55.4 MMHG (ref 35–45)
PCO2 BLD: 58.9 MMHG (ref 35–45)
PCO2 BLD: 65 MMHG (ref 35–45)
PCP UR QL: NEGATIVE
PEEP RESPIRATORY: 0 CMH2O
PEEP RESPIRATORY: 5 CMH2O
PH BLD: 7.11 [PH] (ref 7.35–7.45)
PH BLD: 7.16 [PH] (ref 7.35–7.45)
PH BLD: 7.2 [PH] (ref 7.35–7.45)
PH BLD: 7.29 [PH] (ref 7.35–7.45)
PH BLD: 7.37 [PH] (ref 7.35–7.45)
PH BLD: 7.38 [PH] (ref 7.35–7.45)
PH UR STRIP: 5.5 [PH] (ref 5–8)
PHOSPHATE SERPL-MCNC: 1.5 MG/DL (ref 2.5–4.9)
PHOSPHATE SERPL-MCNC: 3.2 MG/DL (ref 2.5–4.9)
PLATELET # BLD AUTO: 216 K/UL (ref 135–420)
PLATELET # BLD AUTO: 261 K/UL (ref 135–420)
PLATELET # BLD AUTO: 279 K/UL (ref 135–420)
PMV BLD AUTO: 10 FL (ref 9.2–11.8)
PMV BLD AUTO: 10.3 FL (ref 9.2–11.8)
PMV BLD AUTO: 10.3 FL (ref 9.2–11.8)
PO2 BLD: 170 MMHG (ref 80–100)
PO2 BLD: 269 MMHG (ref 80–100)
PO2 BLD: 385 MMHG (ref 80–100)
PO2 BLD: 54 MMHG (ref 80–100)
PO2 BLD: 78 MMHG (ref 80–100)
PO2 BLD: 81 MMHG (ref 80–100)
POTASSIUM SERPL-SCNC: 3.9 MMOL/L (ref 3.5–5.5)
POTASSIUM SERPL-SCNC: 4.3 MMOL/L (ref 3.5–5.5)
POTASSIUM SERPL-SCNC: 4.6 MMOL/L (ref 3.5–5.5)
PROT SERPL-MCNC: 5.9 G/DL (ref 6.4–8.2)
PROT SERPL-MCNC: 6.7 G/DL (ref 6.4–8.2)
PROT UR STRIP-MCNC: ABNORMAL MG/DL
PROTHROMBIN TIME: 23 SEC (ref 11.5–15.2)
PROTHROMBIN TIME: 26.2 SEC (ref 11.5–15.2)
PROTHROMBIN TIME: 56.6 SEC (ref 11.5–15.2)
Q-T INTERVAL, ECG07: 368 MS
Q-T INTERVAL, ECG07: 378 MS
Q-T INTERVAL, ECG07: 402 MS
Q-T INTERVAL, ECG07: 424 MS
QRS DURATION, ECG06: 88 MS
QRS DURATION, ECG06: 90 MS
QRS DURATION, ECG06: 92 MS
QRS DURATION, ECG06: 92 MS
QTC CALCULATION (BEZET), ECG08: 462 MS
QTC CALCULATION (BEZET), ECG08: 478 MS
QTC CALCULATION (BEZET), ECG08: 486 MS
QTC CALCULATION (BEZET), ECG08: 487 MS
RBC # BLD AUTO: 2.62 M/UL (ref 4.2–5.3)
RBC # BLD AUTO: 2.87 M/UL (ref 4.2–5.3)
RBC # BLD AUTO: 3.67 M/UL (ref 4.2–5.3)
RBC #/AREA URNS HPF: ABNORMAL /HPF (ref 0–5)
SAO2 % BLD: 100 % (ref 92–97)
SAO2 % BLD: 100 % (ref 92–97)
SAO2 % BLD: 76 % (ref 92–97)
SAO2 % BLD: 95 % (ref 92–97)
SAO2 % BLD: 96 % (ref 92–97)
SAO2 % BLD: 99 % (ref 92–97)
SERVICE CMNT-IMP: ABNORMAL
SODIUM SERPL-SCNC: 140 MMOL/L (ref 136–145)
SODIUM SERPL-SCNC: 141 MMOL/L (ref 136–145)
SODIUM SERPL-SCNC: 142 MMOL/L (ref 136–145)
SP GR UR REFRACTOMETRY: 1.01 (ref 1–1.03)
SPECIMEN EXP DATE BLD: NORMAL
SPECIMEN TYPE: ABNORMAL
STATUS OF UNIT,%ST: NORMAL
STATUS OF UNIT,%ST: NORMAL
TOTAL RESP. RATE, ITRR: 0
TOTAL RESP. RATE, ITRR: 12
TOTAL RESP. RATE, ITRR: 14
TOTAL RESP. RATE, ITRR: 14
TRIGL SERPL-MCNC: 198 MG/DL (ref ?–150)
TROPONIN I SERPL-MCNC: 0.02 NG/ML (ref 0–0.04)
UNIT DIVISION, %UDIV: 0
UNIT DIVISION, %UDIV: 0
UROBILINOGEN UR QL STRIP.AUTO: 1 EU/DL (ref 0.2–1)
VENTILATION MODE VENT: ABNORMAL
VENTRICULAR RATE, ECG03: 100 BPM
VENTRICULAR RATE, ECG03: 79 BPM
VENTRICULAR RATE, ECG03: 85 BPM
VENTRICULAR RATE, ECG03: 95 BPM
VLDLC SERPL CALC-MCNC: 39.6 MG/DL
VOLUME CONTROL PLUS IVLCP: YES
VT SETTING VENT: 400 ML
VT SETTING VENT: 500 ML
VT SETTING VENT: 500 ML
WBC # BLD AUTO: 7.8 K/UL (ref 4.6–13.2)
WBC # BLD AUTO: 8.4 K/UL (ref 4.6–13.2)
WBC # BLD AUTO: 9.5 K/UL (ref 4.6–13.2)
WBC URNS QL MICRO: ABNORMAL /HPF (ref 0–4)

## 2018-01-01 PROCEDURE — 96365 THER/PROPH/DIAG IV INF INIT: CPT

## 2018-01-01 PROCEDURE — 80053 COMPREHEN METABOLIC PANEL: CPT | Performed by: FAMILY MEDICINE

## 2018-01-01 PROCEDURE — 74011000250 HC RX REV CODE- 250: Performed by: FAMILY MEDICINE

## 2018-01-01 PROCEDURE — 36600 WITHDRAWAL OF ARTERIAL BLOOD: CPT

## 2018-01-01 PROCEDURE — 5A1945Z RESPIRATORY VENTILATION, 24-96 CONSECUTIVE HOURS: ICD-10-PCS | Performed by: INTERNAL MEDICINE

## 2018-01-01 PROCEDURE — 82803 BLOOD GASES ANY COMBINATION: CPT

## 2018-01-01 PROCEDURE — 96375 TX/PRO/DX INJ NEW DRUG ADDON: CPT

## 2018-01-01 PROCEDURE — 84100 ASSAY OF PHOSPHORUS: CPT | Performed by: FAMILY MEDICINE

## 2018-01-01 PROCEDURE — 81001 URINALYSIS AUTO W/SCOPE: CPT | Performed by: EMERGENCY MEDICINE

## 2018-01-01 PROCEDURE — 85027 COMPLETE CBC AUTOMATED: CPT | Performed by: FAMILY MEDICINE

## 2018-01-01 PROCEDURE — 74011250636 HC RX REV CODE- 250/636: Performed by: INTERNAL MEDICINE

## 2018-01-01 PROCEDURE — 74011000250 HC RX REV CODE- 250: Performed by: NURSE PRACTITIONER

## 2018-01-01 PROCEDURE — 83036 HEMOGLOBIN GLYCOSYLATED A1C: CPT | Performed by: FAMILY MEDICINE

## 2018-01-01 PROCEDURE — 36415 COLL VENOUS BLD VENIPUNCTURE: CPT | Performed by: FAMILY MEDICINE

## 2018-01-01 PROCEDURE — 82330 ASSAY OF CALCIUM: CPT | Performed by: FAMILY MEDICINE

## 2018-01-01 PROCEDURE — 93005 ELECTROCARDIOGRAM TRACING: CPT

## 2018-01-01 PROCEDURE — 83735 ASSAY OF MAGNESIUM: CPT | Performed by: FAMILY MEDICINE

## 2018-01-01 PROCEDURE — 85610 PROTHROMBIN TIME: CPT | Performed by: FAMILY MEDICINE

## 2018-01-01 PROCEDURE — 80076 HEPATIC FUNCTION PANEL: CPT | Performed by: FAMILY MEDICINE

## 2018-01-01 PROCEDURE — 74011250636 HC RX REV CODE- 250/636: Performed by: FAMILY MEDICINE

## 2018-01-01 PROCEDURE — 74011000250 HC RX REV CODE- 250: Performed by: INTERNAL MEDICINE

## 2018-01-01 PROCEDURE — 75810000137 HC PLCMT CENT VENOUS CATH

## 2018-01-01 PROCEDURE — 85610 PROTHROMBIN TIME: CPT | Performed by: EMERGENCY MEDICINE

## 2018-01-01 PROCEDURE — 82565 ASSAY OF CREATININE: CPT

## 2018-01-01 PROCEDURE — 77030008768 HC TU NG VYGC -A

## 2018-01-01 PROCEDURE — 86900 BLOOD TYPING SEROLOGIC ABO: CPT | Performed by: EMERGENCY MEDICINE

## 2018-01-01 PROCEDURE — 30233K1 TRANSFUSION OF NONAUTOLOGOUS FROZEN PLASMA INTO PERIPHERAL VEIN, PERCUTANEOUS APPROACH: ICD-10-PCS | Performed by: FAMILY MEDICINE

## 2018-01-01 PROCEDURE — 70450 CT HEAD/BRAIN W/O DYE: CPT

## 2018-01-01 PROCEDURE — 77030010545

## 2018-01-01 PROCEDURE — 82962 GLUCOSE BLOOD TEST: CPT

## 2018-01-01 PROCEDURE — 94002 VENT MGMT INPAT INIT DAY: CPT

## 2018-01-01 PROCEDURE — 74011000258 HC RX REV CODE- 258: Performed by: FAMILY MEDICINE

## 2018-01-01 PROCEDURE — 80048 BASIC METABOLIC PNL TOTAL CA: CPT | Performed by: EMERGENCY MEDICINE

## 2018-01-01 PROCEDURE — 74011250636 HC RX REV CODE- 250/636: Performed by: PHYSICIAN ASSISTANT

## 2018-01-01 PROCEDURE — 94003 VENT MGMT INPAT SUBQ DAY: CPT

## 2018-01-01 PROCEDURE — 77030013079 HC BLNKT BAIR HGGR 3M -A

## 2018-01-01 PROCEDURE — 31500 INSERT EMERGENCY AIRWAY: CPT

## 2018-01-01 PROCEDURE — 85730 THROMBOPLASTIN TIME PARTIAL: CPT | Performed by: EMERGENCY MEDICINE

## 2018-01-01 PROCEDURE — 83690 ASSAY OF LIPASE: CPT | Performed by: FAMILY MEDICINE

## 2018-01-01 PROCEDURE — 85730 THROMBOPLASTIN TIME PARTIAL: CPT | Performed by: FAMILY MEDICINE

## 2018-01-01 PROCEDURE — 99285 EMERGENCY DEPT VISIT HI MDM: CPT

## 2018-01-01 PROCEDURE — 65610000009 HC RM ICU NEURO

## 2018-01-01 PROCEDURE — 71045 X-RAY EXAM CHEST 1 VIEW: CPT

## 2018-01-01 PROCEDURE — C9113 INJ PANTOPRAZOLE SODIUM, VIA: HCPCS | Performed by: FAMILY MEDICINE

## 2018-01-01 PROCEDURE — 74011250636 HC RX REV CODE- 250/636: Performed by: EMERGENCY MEDICINE

## 2018-01-01 PROCEDURE — 77030008683 HC TU ET CUF COVD -A

## 2018-01-01 PROCEDURE — 82140 ASSAY OF AMMONIA: CPT | Performed by: FAMILY MEDICINE

## 2018-01-01 PROCEDURE — 74011000250 HC RX REV CODE- 250: Performed by: EMERGENCY MEDICINE

## 2018-01-01 PROCEDURE — 85652 RBC SED RATE AUTOMATED: CPT | Performed by: FAMILY MEDICINE

## 2018-01-01 PROCEDURE — 74011000250 HC RX REV CODE- 250: Performed by: PHYSICIAN ASSISTANT

## 2018-01-01 PROCEDURE — 86141 C-REACTIVE PROTEIN HS: CPT | Performed by: FAMILY MEDICINE

## 2018-01-01 PROCEDURE — 74176 CT ABD & PELVIS W/O CONTRAST: CPT

## 2018-01-01 PROCEDURE — 86900 BLOOD TYPING SEROLOGIC ABO: CPT | Performed by: FAMILY MEDICINE

## 2018-01-01 PROCEDURE — 77030032490 HC SLV COMPR SCD KNE COVD -B

## 2018-01-01 PROCEDURE — P9059 PLASMA, FRZ BETWEEN 8-24HOUR: HCPCS | Performed by: EMERGENCY MEDICINE

## 2018-01-01 PROCEDURE — 80307 DRUG TEST PRSMV CHEM ANLYZR: CPT | Performed by: FAMILY MEDICINE

## 2018-01-01 PROCEDURE — 80061 LIPID PANEL: CPT | Performed by: FAMILY MEDICINE

## 2018-01-01 PROCEDURE — C1751 CATH, INF, PER/CENT/MIDLINE: HCPCS

## 2018-01-01 PROCEDURE — 83930 ASSAY OF BLOOD OSMOLALITY: CPT | Performed by: NURSE PRACTITIONER

## 2018-01-01 PROCEDURE — 74011000250 HC RX REV CODE- 250

## 2018-01-01 PROCEDURE — 85025 COMPLETE CBC W/AUTO DIFF WBC: CPT | Performed by: EMERGENCY MEDICINE

## 2018-01-01 PROCEDURE — 80048 BASIC METABOLIC PNL TOTAL CA: CPT | Performed by: FAMILY MEDICINE

## 2018-01-01 PROCEDURE — 77030037878 HC DRSG MEPILEX >48IN BORD MOLN -B

## 2018-01-01 PROCEDURE — 36430 TRANSFUSION BLD/BLD COMPNT: CPT

## 2018-01-01 PROCEDURE — 85025 COMPLETE CBC W/AUTO DIFF WBC: CPT | Performed by: FAMILY MEDICINE

## 2018-01-01 PROCEDURE — 82533 TOTAL CORTISOL: CPT | Performed by: FAMILY MEDICINE

## 2018-01-01 PROCEDURE — 82550 ASSAY OF CK (CPK): CPT | Performed by: FAMILY MEDICINE

## 2018-01-01 RX ORDER — MANNITOL 20 G/100ML
INJECTION, SOLUTION INTRAVENOUS
Status: DISCONTINUED
Start: 2018-01-01 | End: 2018-01-01 | Stop reason: HOSPADM

## 2018-01-01 RX ORDER — SUCCINYLCHOLINE CHLORIDE 20 MG/ML
100 INJECTION INTRAMUSCULAR; INTRAVENOUS
Status: COMPLETED | OUTPATIENT
Start: 2018-01-01 | End: 2018-01-01

## 2018-01-01 RX ORDER — LEVETIRACETAM 5 MG/ML
500 INJECTION INTRAVASCULAR EVERY 12 HOURS
Status: DISCONTINUED | OUTPATIENT
Start: 2018-01-01 | End: 2018-01-01

## 2018-01-01 RX ORDER — SODIUM CHLORIDE 9 MG/ML
250 INJECTION, SOLUTION INTRAVENOUS AS NEEDED
Status: DISCONTINUED | OUTPATIENT
Start: 2018-01-01 | End: 2018-01-01 | Stop reason: HOSPADM

## 2018-01-01 RX ORDER — METOPROLOL TARTRATE 5 MG/5ML
2.5 INJECTION INTRAVENOUS ONCE
Status: DISCONTINUED | OUTPATIENT
Start: 2018-01-01 | End: 2018-01-01

## 2018-01-01 RX ORDER — PANTOPRAZOLE SODIUM 40 MG/1
40 TABLET, DELAYED RELEASE ORAL EVERY 12 HOURS
Status: DISCONTINUED | OUTPATIENT
Start: 2018-01-01 | End: 2018-01-01

## 2018-01-01 RX ORDER — ONDANSETRON 2 MG/ML
2 INJECTION INTRAMUSCULAR; INTRAVENOUS
Status: DISCONTINUED | OUTPATIENT
Start: 2018-01-01 | End: 2018-01-01

## 2018-01-01 RX ORDER — MAGNESIUM SULFATE 1 G/100ML
1 INJECTION INTRAVENOUS ONCE
Status: COMPLETED | OUTPATIENT
Start: 2018-01-01 | End: 2018-01-01

## 2018-01-01 RX ORDER — SODIUM CHLORIDE 9 MG/ML
500 INJECTION, SOLUTION INTRAVENOUS ONCE
Status: COMPLETED | OUTPATIENT
Start: 2018-01-01 | End: 2018-01-01

## 2018-01-01 RX ORDER — ALLOPURINOL 100 MG/1
TABLET ORAL DAILY
COMMUNITY

## 2018-01-01 RX ORDER — NOREPINEPHRINE BIT/0.9 % NACL 8 MG/250ML
2-30 INFUSION BOTTLE (ML) INTRAVENOUS CONTINUOUS
Status: DISCONTINUED | OUTPATIENT
Start: 2018-01-01 | End: 2018-01-01

## 2018-01-01 RX ORDER — PANTOPRAZOLE SODIUM 40 MG/1
40 GRANULE, DELAYED RELEASE ORAL EVERY 12 HOURS
Status: DISCONTINUED | OUTPATIENT
Start: 2018-01-01 | End: 2018-01-01

## 2018-01-01 RX ORDER — ALBUTEROL SULFATE 0.83 MG/ML
2.5 SOLUTION RESPIRATORY (INHALATION)
Status: DISCONTINUED | OUTPATIENT
Start: 2018-01-01 | End: 2018-01-01 | Stop reason: HOSPADM

## 2018-01-01 RX ORDER — LEVETIRACETAM 10 MG/ML
INJECTION INTRAVASCULAR
Status: DISCONTINUED
Start: 2018-01-01 | End: 2018-01-01 | Stop reason: HOSPADM

## 2018-01-01 RX ORDER — PHENYLEPHRINE HCL IN 0.9% NACL 30MG/250ML
10-200 PLASTIC BAG, INJECTION (ML) INTRAVENOUS
Status: DISCONTINUED | OUTPATIENT
Start: 2018-01-01 | End: 2018-01-01

## 2018-01-01 RX ORDER — WARFARIN SODIUM 5 MG/1
5 TABLET ORAL DAILY
Qty: 60 TAB | Refills: 3 | Status: SHIPPED | OUTPATIENT
Start: 2018-01-01

## 2018-01-01 RX ORDER — MANNITOL 20 G/100ML
100 INJECTION, SOLUTION INTRAVENOUS
Status: COMPLETED | OUTPATIENT
Start: 2018-01-01 | End: 2018-01-01

## 2018-01-01 RX ORDER — NOREPINEPHRINE BIT/0.9 % NACL 8 MG/250ML
2-16 INFUSION BOTTLE (ML) INTRAVENOUS CONTINUOUS
Status: DISCONTINUED | OUTPATIENT
Start: 2018-01-01 | End: 2018-01-01

## 2018-01-01 RX ORDER — NICARDIPINE HYDROCHLORIDE 0.1 MG/ML
0-15 INJECTION INTRAVENOUS
Status: DISCONTINUED | OUTPATIENT
Start: 2018-01-01 | End: 2018-01-01 | Stop reason: CLARIF

## 2018-01-01 RX ORDER — SODIUM CHLORIDE 9 MG/ML
50 INJECTION, SOLUTION INTRAVENOUS CONTINUOUS
Status: DISCONTINUED | OUTPATIENT
Start: 2018-01-01 | End: 2018-01-01

## 2018-01-01 RX ORDER — ACETAMINOPHEN 650 MG/1
650 SUPPOSITORY RECTAL
Status: DISCONTINUED | OUTPATIENT
Start: 2018-01-01 | End: 2018-01-01 | Stop reason: HOSPADM

## 2018-01-01 RX ORDER — AMOXICILLIN 250 MG
2 CAPSULE ORAL
Status: DISCONTINUED | OUTPATIENT
Start: 2018-01-01 | End: 2018-01-01

## 2018-01-01 RX ORDER — LABETALOL HCL 20 MG/4 ML
5 SYRINGE (ML) INTRAVENOUS
Status: DISCONTINUED | OUTPATIENT
Start: 2018-01-01 | End: 2018-01-01

## 2018-01-01 RX ORDER — MANNITOL 20 G/100ML
25 INJECTION, SOLUTION INTRAVENOUS EVERY 6 HOURS
Status: DISCONTINUED | OUTPATIENT
Start: 2018-01-01 | End: 2018-01-01

## 2018-01-01 RX ORDER — BISACODYL 5 MG
5 TABLET, DELAYED RELEASE (ENTERIC COATED) ORAL DAILY PRN
Status: DISCONTINUED | OUTPATIENT
Start: 2018-01-01 | End: 2018-01-01

## 2018-01-01 RX ORDER — ETOMIDATE 2 MG/ML
20 INJECTION INTRAVENOUS
Status: COMPLETED | OUTPATIENT
Start: 2018-01-01 | End: 2018-01-01

## 2018-01-01 RX ORDER — FACIAL-BODY WIPES
10 EACH TOPICAL DAILY PRN
Status: DISCONTINUED | OUTPATIENT
Start: 2018-01-01 | End: 2018-01-01

## 2018-01-01 RX ORDER — ACETAMINOPHEN 325 MG/1
650 TABLET ORAL
Status: DISCONTINUED | OUTPATIENT
Start: 2018-01-01 | End: 2018-01-01 | Stop reason: HOSPADM

## 2018-01-01 RX ORDER — SODIUM CHLORIDE 9 MG/ML
250 INJECTION, SOLUTION INTRAVENOUS AS NEEDED
Status: DISCONTINUED | OUTPATIENT
Start: 2018-01-01 | End: 2018-01-01

## 2018-01-01 RX ADMIN — MAGNESIUM SULFATE HEPTAHYDRATE 1 G: 1 INJECTION, SOLUTION INTRAVENOUS at 11:08

## 2018-01-01 RX ADMIN — Medication 100 MCG/MIN: at 21:01

## 2018-01-01 RX ADMIN — SODIUM CHLORIDE 50 ML/HR: 900 INJECTION, SOLUTION INTRAVENOUS at 01:28

## 2018-01-01 RX ADMIN — NOREPINEPHRINE BITARTRATE 14 MCG/MIN: 1 INJECTION INTRAVENOUS at 19:22

## 2018-01-01 RX ADMIN — NOREPINEPHRINE BITARTRATE 30 MCG/MIN: 1 INJECTION INTRAVENOUS at 07:41

## 2018-01-01 RX ADMIN — NOREPINEPHRINE BITARTRATE 30 MCG/MIN: 1 INJECTION INTRAVENOUS at 03:51

## 2018-01-01 RX ADMIN — SODIUM CHLORIDE 40 MG: 9 INJECTION, SOLUTION INTRAMUSCULAR; INTRAVENOUS; SUBCUTANEOUS at 11:51

## 2018-01-01 RX ADMIN — SODIUM CHLORIDE 40 MG: 9 INJECTION, SOLUTION INTRAMUSCULAR; INTRAVENOUS; SUBCUTANEOUS at 08:43

## 2018-01-01 RX ADMIN — FOLIC ACID: 5 INJECTION, SOLUTION INTRAMUSCULAR; INTRAVENOUS; SUBCUTANEOUS at 15:40

## 2018-01-01 RX ADMIN — LEVETIRACETAM 500 MG: 5 INJECTION INTRAVENOUS at 23:31

## 2018-01-01 RX ADMIN — MANNITOL 25 G: 20 INJECTION, SOLUTION INTRAVENOUS at 05:55

## 2018-01-01 RX ADMIN — NOREPINEPHRINE BITARTRATE 30 MCG/MIN: 1 INJECTION INTRAVENOUS at 00:03

## 2018-01-01 RX ADMIN — NOREPINEPHRINE BITARTRATE 30 MCG/MIN: 1 INJECTION INTRAVENOUS at 15:41

## 2018-01-01 RX ADMIN — SODIUM CHLORIDE 2000 MG: 900 INJECTION, SOLUTION INTRAVENOUS at 01:10

## 2018-01-01 RX ADMIN — SUCCINYLCHOLINE CHLORIDE 100 MG: 20 INJECTION, SOLUTION INTRAMUSCULAR; INTRAVENOUS; PARENTERAL at 01:01

## 2018-01-01 RX ADMIN — CEFTRIAXONE 1 G: 1 INJECTION, POWDER, FOR SOLUTION INTRAMUSCULAR; INTRAVENOUS at 12:35

## 2018-01-01 RX ADMIN — MANNITOL 25 G: 20 INJECTION, SOLUTION INTRAVENOUS at 17:01

## 2018-01-01 RX ADMIN — Medication 100 MCG/MIN: at 05:56

## 2018-01-01 RX ADMIN — LEVETIRACETAM 500 MG: 5 INJECTION INTRAVENOUS at 12:00

## 2018-01-01 RX ADMIN — NOREPINEPHRINE BITARTRATE 25 MCG/MIN: 1 INJECTION INTRAVENOUS at 08:59

## 2018-01-01 RX ADMIN — SODIUM CHLORIDE 500 ML: 900 INJECTION, SOLUTION INTRAVENOUS at 08:24

## 2018-01-01 RX ADMIN — LEVETIRACETAM 500 MG: 5 INJECTION INTRAVENOUS at 23:55

## 2018-01-01 RX ADMIN — LEVETIRACETAM 500 MG: 5 INJECTION INTRAVENOUS at 12:34

## 2018-01-01 RX ADMIN — SODIUM CHLORIDE 50 ML/HR: 900 INJECTION, SOLUTION INTRAVENOUS at 09:03

## 2018-01-01 RX ADMIN — FOLIC ACID: 5 INJECTION, SOLUTION INTRAMUSCULAR; INTRAVENOUS; SUBCUTANEOUS at 17:27

## 2018-01-01 RX ADMIN — MANNITOL 25 G: 20 INJECTION, SOLUTION INTRAVENOUS at 23:27

## 2018-01-01 RX ADMIN — CEFTRIAXONE 1 G: 1 INJECTION, POWDER, FOR SOLUTION INTRAMUSCULAR; INTRAVENOUS at 08:43

## 2018-01-01 RX ADMIN — NOREPINEPHRINE BITARTRATE 16 MCG/MIN: 1 INJECTION INTRAVENOUS at 05:19

## 2018-01-01 RX ADMIN — Medication 100 MCG/MIN: at 01:27

## 2018-01-01 RX ADMIN — SODIUM CHLORIDE 9 MMOL: 900 INJECTION, SOLUTION INTRAVENOUS at 13:15

## 2018-01-01 RX ADMIN — MANNITOL 25 G: 20 INJECTION, SOLUTION INTRAVENOUS at 23:56

## 2018-01-01 RX ADMIN — MANNITOL 100 G: 20 INJECTION, SOLUTION INTRAVENOUS at 01:57

## 2018-01-01 RX ADMIN — MANNITOL 25 G: 20 INJECTION, SOLUTION INTRAVENOUS at 13:14

## 2018-01-01 RX ADMIN — SODIUM CHLORIDE 40 MG: 9 INJECTION, SOLUTION INTRAMUSCULAR; INTRAVENOUS; SUBCUTANEOUS at 22:41

## 2018-01-01 RX ADMIN — NOREPINEPHRINE BITARTRATE 30 MCG/MIN: 1 INJECTION INTRAVENOUS at 19:46

## 2018-01-01 RX ADMIN — Medication 10 MCG/MIN: at 14:37

## 2018-01-01 RX ADMIN — MANNITOL 25 G: 20 INJECTION, SOLUTION INTRAVENOUS at 08:01

## 2018-01-01 RX ADMIN — ETOMIDATE 20 MG: 40 INJECTION, SOLUTION INTRAVENOUS at 01:00

## 2018-01-01 RX ADMIN — NOREPINEPHRINE BITARTRATE 4 MCG/MIN: 1 INJECTION INTRAVENOUS at 09:08

## 2018-01-01 RX ADMIN — MANNITOL 25 G: 20 INJECTION, SOLUTION INTRAVENOUS at 05:11

## 2018-01-10 NOTE — PROGRESS NOTES
HISTORY OF PRESENT ILLNESS  Ruben Fernandez is a [de-identified] y.o. female. HPI Comments: Patient with afib,chf,htn,ckd. On follow up patient denies any chest pains,sob, palpitation or other significant symptoms. CHF   The history is provided by the patient. This is a chronic problem. The problem occurs constantly. The problem has not changed since onset. Pertinent negatives include no chest pain, no abdominal pain, no headaches and no shortness of breath. Palpitations    The history is provided by the patient. This is a chronic problem. The problem has been resolved. Pertinent negatives include no fever, no chest pain, no claudication, no orthopnea, no PND, no abdominal pain, no nausea, no vomiting, no headaches, no dizziness, no weakness, no cough, no hemoptysis, no shortness of breath and no sputum production. Review of Systems   Constitutional: Negative for chills and fever. HENT: Negative for nosebleeds. Eyes: Negative for blurred vision and double vision. Respiratory: Negative for cough, hemoptysis, sputum production, shortness of breath and wheezing. Cardiovascular: Positive for palpitations. Negative for chest pain, orthopnea, claudication, leg swelling and PND. Gastrointestinal: Negative for abdominal pain, heartburn, nausea and vomiting. Musculoskeletal: Negative for myalgias. Skin: Negative for rash. Neurological: Negative for dizziness, weakness and headaches. Endo/Heme/Allergies: Does not bruise/bleed easily.      Family History   Problem Relation Age of Onset    Heart Disease Neg Hx        Past Medical History:   Diagnosis Date    Arthritis     [facility]     Atrial fibrillation (HCC)      in sr post cardioversion off multaq due to bradycardia    Back pain     BMI 33.0-33.9,adult 5/22/2017    Cancer ([address] 1997    bladder    COPD (chronic obstructive pulmonary disease) ([facility]     Depression     Diarrhea     Essential hypertension, benign     STABLE    Glaucoma     Hip pain     Hypertension     IBS (irritable bowel syndrome)     Leg pain     Malignant neoplasm of bladder     Menopause     Mood disorder ([facility] 12/17/97    due to major surgery, mixed depressive anxious features       Past Surgical History:   Procedure Laterality Date    CYSTOSCOPY  11/11/97    excision of a suburethral mass    EGD  12/19/97    HX BREAST BIOPSY Left 1998    benign    HX HYSTERECTOMY      HX OTHER SURGICAL      cervical disc surgery    HX UROLOGICAL  1997    bladder removed; ileostomy    HX UROLOGICAL  1/6/98    Vaginal examination under anesthesia, removal of vaginal packing and irrigation tubing. Injection of radio-opaque contrast and fluoroscopy.  HX UROLOGICAL  12/23/97    Debridement of necrotic portion gracilis flap, vaginal vault including skin and subcutaneous fat, cautery and application of vaginal packing. Allergies   Allergen Reactions    Dairy-Aid Unknown (comments)     Dairy products      Lyrica [Pregabalin] Other (comments)     lightheaded    Vicodin [Hydrocodone-Acetaminophen] Nausea Only       Current Outpatient Prescriptions   Medication Sig    allopurinol (ZYLOPRIM) 100 mg tablet Take  by mouth daily.  apixaban (ELIQUIS) 5 mg tablet Take 1 Tab by mouth two (2) times a day.  valsartan-hydrochlorothiazide (DIOVAN-HCT) 80-12.5 mg per tablet Take 1 Tab by mouth daily.  gabapentin (NEURONTIN) 300 mg capsule Take 300 mg by mouth.  aspirin 81 mg chewable tablet Take 1 Tab by mouth daily. No current facility-administered medications for this visit. Visit Vitals    /86    Pulse 95    Ht 5' 5\" (1.651 m)    Wt 94.3 kg (208 lb)    BMI 34.61 kg/m2         Physical Exam   Constitutional: She is oriented to person, place, and time. She appears well-developed and well-nourished. HENT:   Head: Normocephalic and atraumatic. Eyes: Conjunctivae are normal.   Neck: Neck supple. No JVD present. No tracheal deviation present. No thyromegaly present. Cardiovascular: Normal rate. An irregularly irregular rhythm present. PMI is not displaced. Exam reveals no gallop, no S3 and no decreased pulses. Murmur heard. Holosystolic murmur is present  at the lower left sternal border  Pulmonary/Chest: No respiratory distress. She has no wheezes. She has no rales. She exhibits no tenderness. Abdominal: Soft. There is no tenderness. urostomy   Musculoskeletal: She exhibits no edema. Neurological: She is alert and oriented to person, place, and time. Skin: Skin is warm. Psychiatric: She has a normal mood and affect. Ms. Elizabeth Haywood has a reminder for a \"due or due soon\" health maintenance. I have asked that she contact her primary care provider for follow-up on this health maintenance. CARDIOLOGY STUDIES 7/1/2012   Myocardial Perfusion Scan Result NORMAL   Echocardiogram - Complete Result NORMAL LV EF   Some recent data might be hidden     FINDINGS 10/2014  1. Stress images revealed normal Myoview distribution in all the LV   segments in short axis, vertical and horizontal long axis views. 2. Resting images have a normal uptake. 3. Gated images reveal normal wall motion and ejection fraction is   calculated at 79%. CONCLUSION   1. Normal perfusion scan. 2. Normal wall motion and ejection fraction. SUMMARY:echo:10/2014  Left ventricle: Systolic function was normal by EF (biplane method of  disks). Ejection fraction was estimated to be 59 %. No obvious wall motion  abnormalities identified in the views obtained. Doppler parameters were  consistent with abnormal left ventricular relaxation (grade 1 diastolic  dysfunction). Assessment       ICD-10-CM ICD-9-CM    1. Paroxysmal atrial fibrillation (HCC) I48.0 427.31 AMB POC EKG ROUTINE W/ 12 LEADS, INTER & REP      2D ECHO COMPLETE ADULT (TTE)    recurrent a fib-?? duration  add anticoagulatio   2. Essential hypertension I10 401.9     controlled   3.  Chronic diastolic heart failure (HCC) I50.32 428.32 2D ECHO COMPLETE ADULT (TTE)    stable  exertional sob stable   4. CKD (chronic kidney disease), stage III N18.3 585.3     stable   5. Neuropathy G62.9 355.9     numbness and leg pains       Medications Discontinued During This Encounter   Medication Reason    HYDROcodone-acetaminophen (NORCO) 7.5-325 mg per tablet Duplicate Order    b complex-vitamin c-folic acid (NEPHROCAPS) 1 mg capsule Not A Current Medication    docusate sodium (COLACE) 100 mg capsule Not A Current Medication    predniSONE (STERAPRED) 5 mg dose pack Not A Current Medication    traMADol (ULTRAM) 50 mg tablet Not A Current Medication    HYDROcodone-acetaminophen (NORCO) 7.5-325 mg per tablet Not A Current Medication       Orders Placed This Encounter    2D ECHO COMPLETE ADULT (TTE)     Standing Status:   Future     Standing Expiration Date:   7/9/2018     Order Specific Question:   Reason for Exam:     Answer:   see diagnosis    AMB POC EKG ROUTINE W/ 12 LEADS, INTER & REP     Order Specific Question:   Reason for Exam:     Answer:   a fib    apixaban (ELIQUIS) 5 mg tablet     Sig: Take 1 Tab by mouth two (2) times a day. Dispense:  60 Tab     Refill:  6       Follow-up Disposition:  Return for F/u after tests.

## 2018-01-10 NOTE — MR AVS SNAPSHOT
Visit Information Date & Time Provider Department Dept. Phone Encounter #  
 1/10/2018 12:15 PM Sandip Hernandez MD [facility] [address] [place] [address] [id number] Follow-up Instructions Return for F/u after tests. Upcoming Health Maintenance Date Due DTaP/Tdap/Td series (1 - Tdap) 5/16/1958 ZOSTER VACCINE AGE 60> 3/16/1997 GLAUCOMA SCREENING Q2Y 5/16/2002 OSTEOPOROSIS SCREENING (DEXA) 5/16/2002 MEDICARE YEARLY EXAM 5/16/2002 Pneumococcal 65+ High/Highest Risk (2 of 2 - PCV13) 10/18/2015 Influenza Age 5 to Adult 8/1/2017 Allergies as of 1/10/2018  Review Complete On: 1/10/2018 By: Sandip Hernandez MD  
  
 Severity Noted Reaction Type Reactions Dairy-aid  08/04/2012    Unknown (comments) Dairy products Lyrica [Pregabalin]    Other (comments) lightheaded Vicodin [Hydrocodone-acetaminophen]    Nausea Only Current Immunizations  Never Reviewed Name Date Influenza Vaccine PF 10/18/2014  7:57 PM  
 Pneumococcal Polysaccharide (PPSV-23) 10/18/2014  7:58 PM  
  
 Not reviewed this visit You Were Diagnosed With   
  
 Codes Comments Paroxysmal atrial fibrillation (HCC)    -  Primary ICD-10-CM: I48.0 ICD-9-CM: 427.31 recurrent a fib-?? duration 
add anticoagulatio Essential hypertension     ICD-10-CM: I10 
ICD-9-CM: 401.9 controlled Chronic diastolic heart failure (HCC)     ICD-10-CM: I50.32 
ICD-9-CM: 428.32 stable 
exertional sob stable CKD (chronic kidney disease), stage III     ICD-10-CM: N18.3 ICD-9-CM: 585.3 stable Neuropathy     ICD-10-CM: G62.9 ICD-9-CM: 355.9 numbness and leg pains Vitals BP Pulse Height(growth percentile) Weight(growth percentile) BMI Smoking Status 134/86 95 5' 5\" (1.651 m) 208 lb (94.3 kg) 34.61 kg/m2 Current Some Day Smoker Vitals History BMI and BSA Data Body Mass Index Body Surface Area  
 34.61 kg/m 2 2.08 m 2 Preferred Pharmacy Pharmacy Name Phone [address] [place] [address], [address] Your Updated Medication List  
  
   
This list is accurate as of: 1/10/18  1:12 PM.  Always use your most recent med list.  
  
  
  
  
 allopurinol 100 mg tablet Commonly known as:  April Palms Take  by mouth daily. apixaban 5 mg tablet Commonly known as:  David Roshan Take 1 Tab by mouth two (2) times a day. aspirin 81 mg chewable tablet Take 1 Tab by mouth daily. gabapentin 300 mg capsule Commonly known as:  NEURONTIN Take 300 mg by mouth.  
  
 valsartan-hydroCHLOROthiazide 80-12.5 mg per tablet Commonly known as:  DIOVAN-HCT Take 1 Tab by mouth daily. Prescriptions Sent to Pharmacy Refills  
 apixaban (ELIQUIS) 5 mg tablet 6 Sig: Take 1 Tab by mouth two (2) times a day. Class: Normal  
 Pharmacy: [address] [place] [address], [facility] #: [phone] Route: Oral  
  
We Performed the Following AMB POC EKG ROUTINE W/ 12 LEADS, INTER & REP [67728 CPT(R)] Follow-up Instructions Return for F/u after tests. To-Do List   
 01/13/2018 Cardiac Services:  2D ECHO COMPLETE ADULT (TTE) Please provide this summary of care documentation to your next provider. Your primary care clinician is listed as Erna. If you have any questions after today's visit, please call [phone].

## 2018-01-10 NOTE — PROGRESS NOTES
Patient didn't bring medications, verbally reviewed    1. Have you been to the ER, urgent care clinic since your last visit? Hospitalized since your last visit? No    2. Have you seen or consulted any other health care providers outside of the [address] [place] [address] since your last visit? Include any pap smears or colon screening.  Yes Where: PCP Routine

## 2018-01-10 NOTE — LETTER
Leonamichael Zendejas 1935 
 
1/10/2018 Dear MD Erna 
 
I had the pleasure of evaluating  Ms. Lucian Soulier in office today. Below are the relevant portions of my assessment and plan of care. ICD-10-CM ICD-9-CM 1. Paroxysmal atrial fibrillation (HCC) I48.0 427.31 AMB POC EKG ROUTINE W/ 12 LEADS, INTER & REP  
   2D ECHO COMPLETE ADULT (TTE)  
 recurrent a fib-?? duration 
add anticoagulatio 2. Essential hypertension I10 401.9   
 controlled 3. Chronic diastolic heart failure (HCC) I50.32 428.32 2D ECHO COMPLETE ADULT (TTE) stable 
exertional sob stable 4. CKD (chronic kidney disease), stage III N18.3 585.3   
 stable 5. Neuropathy G62.9 355.9   
 numbness and leg pains Current Outpatient Prescriptions Medication Sig Dispense Refill  allopurinol (ZYLOPRIM) 100 mg tablet Take  by mouth daily.  apixaban (ELIQUIS) 5 mg tablet Take 1 Tab by mouth two (2) times a day. 60 Tab 6  
 valsartan-hydrochlorothiazide (DIOVAN-HCT) 80-12.5 mg per tablet Take 1 Tab by mouth daily.  gabapentin (NEURONTIN) 300 mg capsule Take 300 mg by mouth.  aspirin 81 mg chewable tablet Take 1 Tab by mouth daily. 30 Tab 3 Orders Placed This Encounter  2D ECHO COMPLETE ADULT (TTE) Standing Status:   Future Standing Expiration Date:   7/9/2018 Order Specific Question:   Reason for Exam: Answer:   see diagnosis  AMB POC EKG ROUTINE W/ 12 LEADS, INTER & REP Order Specific Question:   Reason for Exam: Answer:   a fib  allopurinol (ZYLOPRIM) 100 mg tablet Sig: Take  by mouth daily.  apixaban (ELIQUIS) 5 mg tablet Sig: Take 1 Tab by mouth two (2) times a day. Dispense:  60 Tab Refill:  6 If you have questions, please do not hesitate to call me. I look forward to following Ms. Lucian Soulier along with you. Sincerely, David Sinclair MD

## 2018-01-26 NOTE — PROGRESS NOTES
Patient didn't bring medications, verbally reviewed    1. Have you been to the ER, urgent care clinic since your last visit? Hospitalized since your last visit? No    2. Have you seen or consulted any other health care providers outside of the [address] [place] [address] since your last visit? Include any pap smears or colon screening.  Yes Where: GYN Routine

## 2018-01-26 NOTE — MR AVS SNAPSHOT
Claudia Hernandez 
 
 
 [address], [address] [facility] [address] 
[phone] Patient: Jayant Coyle MRN: [id number] DDA:3[birth year] Visit Information Date & Time Provider Department Dept. Phone Encounter #  
 1/26/2018 11:00 AM Naty Tang MD [facility] [address] [place] [address] [id number] Follow-up Instructions Return in about 6 months (around 7/26/2018). Your Appointments 7/13/2018 10:00 AM  
ESTABLISHED PATIENT with Naty Tang MD  
[facility]) Appt Note: 6 months [address], [address] [facility] [id number] [address], [address] [facility] [address] [place] Upcoming Health Maintenance Date Due DTaP/Tdap/Td series (1 - Tdap) 5/16/1958 ZOSTER VACCINE AGE 60> 3/16/1997 GLAUCOMA SCREENING Q2Y 5/16/2002 OSTEOPOROSIS SCREENING (DEXA) 5/16/2002 MEDICARE YEARLY EXAM 5/16/2002 Pneumococcal 65+ High/Highest Risk (2 of 2 - PCV13) 10/18/2015 Influenza Age 5 to Adult 8/1/2017 Allergies as of 1/26/2018  Review Complete On: 1/26/2018 By: Naty Tang MD  
  
 Severity Noted Reaction Type Reactions Dairy-aid  08/04/2012    Unknown (comments) Dairy products Lyrica [Pregabalin]    Other (comments) lightheaded Vicodin [Hydrocodone-acetaminophen]    Nausea Only Current Immunizations  Never Reviewed Name Date Influenza Vaccine PF 10/18/2014  7:57 PM  
 Pneumococcal Polysaccharide (PPSV-23) 10/18/2014  7:58 PM  
  
 Not reviewed this visit You Were Diagnosed With   
  
 Codes Comments Chronic diastolic heart failure (HCC)    -  Primary ICD-10-CM: I50.32 
ICD-9-CM: 428.32 normal lvef 
stable Paroxysmal atrial fibrillation (HCC)     ICD-10-CM: I48.0 ICD-9-CM: 427.31 stable Essential hypertension     ICD-10-CM: I10 
ICD-9-CM: 401.9 controlled  Anticoagulant long-term use     ICD-10-CM: Z79.01 
 ICD-9-CM: V58.61 on eliquis  
unable to afford Vitals BP Pulse Height(growth percentile) Weight(growth percentile) LMP BMI  
 131/74 (!) 106 5' 5\" (1.651 m) 214 lb (97.1 kg) (Exact Date) 35.61 kg/m2 Smoking Status Current Some Day Smoker Vitals History BMI and BSA Data Body Mass Index Body Surface Area  
 35.61 kg/m 2 2.11 m 2 Preferred Pharmacy Pharmacy Name Phone [facility], [address] Your Updated Medication List  
  
   
This list is accurate as of: 1/26/18 11:04 AM.  Always use your most recent med list.  
  
  
  
  
 allopurinol 100 mg tablet Commonly known as:  April Palms Take  by mouth daily. aspirin 81 mg chewable tablet Take 1 Tab by mouth daily. gabapentin 300 mg capsule Commonly known as:  NEURONTIN Take 300 mg by mouth.  
  
 valsartan-hydroCHLOROthiazide 80-12.5 mg per tablet Commonly known as:  DIOVAN-HCT Take 1 Tab by mouth daily. warfarin 5 mg tablet Commonly known as:  COUMADIN Take 1 Tab by mouth daily. Prescriptions Sent to Pharmacy Refills  
 warfarin (COUMADIN) 5 mg tablet 3 Sig: Take 1 Tab by mouth daily. Class: Normal  
 Pharmacy:  [facility], [facility] #: [phone] Route: Oral  
  
Follow-up Instructions Return in about 6 months (around 7/26/2018). Please provide this summary of care documentation to your next provider. Your primary care clinician is listed as Erna. If you have any questions after today's visit, please call [phone].

## 2018-01-26 NOTE — PROGRESS NOTES
HISTORY OF PRESENT ILLNESS  Leila Martinez is a [de-identified] y.o. female. HPI Comments: Patient with afib,chf,htn,ckd. On follow up patient denies any chest pains,sob, palpitation or other significant symptoms. Irregular Heart Beat    The history is provided by the patient. This is a chronic problem. The problem has been resolved. Pertinent negatives include no fever, no chest pain, no claudication, no orthopnea, no PND, no abdominal pain, no nausea, no vomiting, no headaches, no dizziness, no weakness, no cough, no hemoptysis, no shortness of breath and no sputum production. CHF   The history is provided by the patient. This is a chronic problem. The problem occurs constantly. The problem has not changed since onset. Pertinent negatives include no chest pain, no abdominal pain, no headaches and no shortness of breath. Review of Systems   Constitutional: Negative for chills and fever. HENT: Negative for nosebleeds. Eyes: Negative for blurred vision and double vision. Respiratory: Negative for cough, hemoptysis, sputum production, shortness of breath and wheezing. Cardiovascular: Positive for palpitations. Negative for chest pain, orthopnea, claudication, leg swelling and PND. Gastrointestinal: Negative for abdominal pain, heartburn, nausea and vomiting. Musculoskeletal: Negative for myalgias. Skin: Negative for rash. Neurological: Negative for dizziness, weakness and headaches. Endo/Heme/Allergies: Does not bruise/bleed easily.      Family History   Problem Relation Age of Onset    Heart Disease Neg Hx        Past Medical History:   Diagnosis Date    Arthritis     [facility]     Atrial fibrillation (HCC)      in sr post cardioversion off multaq due to bradycardia    Back pain     BMI 33.0-33.9,adult 5/22/2017    Cancer ([address] 1997    bladder    COPD (chronic obstructive pulmonary disease) ([facility]     Depression     Diarrhea     Essential hypertension, benign     STABLE  Glaucoma     Hip pain     Hypertension     IBS (irritable bowel syndrome)     Leg pain     Malignant neoplasm of bladder     Menopause     Mood disorder ([facility] 12/17/97    due to major surgery, mixed depressive anxious features       Past Surgical History:   Procedure Laterality Date    CYSTOSCOPY  11/11/97    excision of a suburethral mass    EGD  12/19/97    HX BREAST BIOPSY Left 1998    benign    HX HYSTERECTOMY      HX OTHER SURGICAL      cervical disc surgery    HX UROLOGICAL  1997    bladder removed; ileostomy    HX UROLOGICAL  1/6/98    Vaginal examination under anesthesia, removal of vaginal packing and irrigation tubing. Injection of radio-opaque contrast and fluoroscopy.  HX UROLOGICAL  12/23/97    Debridement of necrotic portion gracilis flap, vaginal vault including skin and subcutaneous fat, cautery and application of vaginal packing. Allergies   Allergen Reactions    Dairy-Aid Unknown (comments)     Dairy products      Lyrica [Pregabalin] Other (comments)     lightheaded    Vicodin [Hydrocodone-Acetaminophen] Nausea Only       Current Outpatient Prescriptions   Medication Sig    warfarin (COUMADIN) 5 mg tablet Take 1 Tab by mouth daily.  allopurinol (ZYLOPRIM) 100 mg tablet Take  by mouth daily.  valsartan-hydrochlorothiazide (DIOVAN-HCT) 80-12.5 mg per tablet Take 1 Tab by mouth daily.  gabapentin (NEURONTIN) 300 mg capsule Take 300 mg by mouth.  aspirin 81 mg chewable tablet Take 1 Tab by mouth daily. No current facility-administered medications for this visit. Visit Vitals    /74    Pulse (!) 106    Ht 5' 5\" (1.651 m)    Wt 97.1 kg (214 lb)    LMP  (Exact Date)    BMI 35.61 kg/m2         Physical Exam   Constitutional: She is oriented to person, place, and time. She appears well-developed and well-nourished. HENT:   Head: Normocephalic and atraumatic. Eyes: Conjunctivae are normal.   Neck: Neck supple. No JVD present.  No tracheal deviation present. No thyromegaly present. Cardiovascular: Normal rate. An irregularly irregular rhythm present. PMI is not displaced. Exam reveals no gallop, no S3 and no decreased pulses. Murmur heard. Holosystolic murmur is present  at the lower left sternal border  Pulmonary/Chest: No respiratory distress. She has no wheezes. She has no rales. She exhibits no tenderness. Abdominal: Soft. There is no tenderness. urostomy   Musculoskeletal: She exhibits no edema. Neurological: She is alert and oriented to person, place, and time. Skin: Skin is warm. Psychiatric: She has a normal mood and affect. Ms. Eulalio Madera has a reminder for a \"due or due soon\" health maintenance. I have asked that she contact her primary care provider for follow-up on this health maintenance. CARDIOLOGY STUDIES 7/1/2012   Myocardial Perfusion Scan Result NORMAL   Echocardiogram - Complete Result NORMAL LV EF   Some recent data might be hidden     FINDINGS 10/2014  1. Stress images revealed normal Myoview distribution in all the LV   segments in short axis, vertical and horizontal long axis views. 2. Resting images have a normal uptake. 3. Gated images reveal normal wall motion and ejection fraction is   calculated at 79%. CONCLUSION   1. Normal perfusion scan. 2. Normal wall motion and ejection fraction. SUMMARY:echo:10/2014  Left ventricle: Systolic function was normal by EF (biplane method of  disks). Ejection fraction was estimated to be 59 %. No obvious wall motion  abnormalities identified in the views obtained. Doppler parameters were  consistent with abnormal left ventricular relaxation (grade 1 diastolic  dysfunction). SUMMARY:echo-1/2019  Procedure information: Echocardiographic views were limited by poor  acoustic window availability. Left ventricle: Systolic function was normal. Ejection fraction was  estimated in the range of 55 % to 60 %.  There were no regional wall motion  abnormalities. Mitral valve: There was mild regurgitation. Assessment       ICD-10-CM ICD-9-CM    1. Chronic diastolic heart failure (HCC) I50.32 428.32     normal lvef  stable   2. Paroxysmal atrial fibrillation (HCC) I48.0 427.31     stable   3. Essential hypertension I10 401.9     controlled   4. Anticoagulant long-term use Z79.01 V58.61     on eliquis   unable to afford   1/2018  eliquis changed to coumadin due to cost    Medications Discontinued During This Encounter   Medication Reason    apixaban (ELIQUIS) 5 mg tablet Alternate Therapy       Orders Placed This Encounter    warfarin (COUMADIN) 5 mg tablet     Sig: Take 1 Tab by mouth daily. Dispense:  60 Tab     Refill:  3       Follow-up Disposition:  Return in about 6 months (around 7/26/2018).

## 2018-01-26 NOTE — LETTER
Diogenes Krystle 1935 1/26/2018 Dear Nat Acosta MD 
 
I had the pleasure of evaluating  Ms. Ramon Melendez in office today. Below are the relevant portions of my assessment and plan of care. ICD-10-CM ICD-9-CM 1. Chronic diastolic heart failure (HCC) I50.32 428.32   
 normal lvef 
stable 2. Paroxysmal atrial fibrillation (HCC) I48.0 427.31   
 stable 3. Essential hypertension I10 401.9   
 controlled Current Outpatient Prescriptions Medication Sig Dispense Refill  apixaban (ELIQUIS) 5 mg tablet Take 1 Tab by mouth two (2) times a day. 180 Tab 4  
 allopurinol (ZYLOPRIM) 100 mg tablet Take  by mouth daily.  valsartan-hydrochlorothiazide (DIOVAN-HCT) 80-12.5 mg per tablet Take 1 Tab by mouth daily.  gabapentin (NEURONTIN) 300 mg capsule Take 300 mg by mouth.  aspirin 81 mg chewable tablet Take 1 Tab by mouth daily. 30 Tab 3 No orders of the defined types were placed in this encounter. If you have questions, please do not hesitate to call me. I look forward to following Ms. Ramon Melendez along with you. Sincerely, Jose De Jesus Gallagher MD

## 2018-02-02 NOTE — TELEPHONE ENCOUNTER
Patient called stated Dr. Frederic Robertson was giving her samples of eliquis 5 mg so she will not start warfarin at this time.

## 2018-05-28 PROBLEM — G93.40 ENCEPHALOPATHY: Status: ACTIVE | Noted: 2018-01-01

## 2018-05-28 PROBLEM — N39.0 UTI (URINARY TRACT INFECTION): Status: ACTIVE | Noted: 2018-01-01

## 2018-05-28 PROBLEM — I61.9 ICH (INTRACEREBRAL HEMORRHAGE) (HCC): Status: ACTIVE | Noted: 2018-01-01

## 2018-05-28 PROBLEM — J96.90 RESPIRATORY FAILURE (HCC): Status: ACTIVE | Noted: 2018-01-01

## 2018-05-28 NOTE — CONSULTS
[facility]  Pulmonary, Critical Care, and Sleep Medicine    Name: Janessa Robb MRN: [id number]   : 1935 Hospital: [address] [place] [address]   Date: 2018        Critical Care Initial Patient Consult    Requesting MD:         Daily Charlton MD                                         Reason for CC Consult:  ICH on MV  IMPRESSION:   Patient Active Problem List   Diagnosis Code    Hypertension I10    AF (atrial fibrillation) ([facility]) I48.91    Depression F32.9    IBS (irritable bowel syndrome) K58.9    Hx of bladder cancer Z85.51    Attention to urostomy ([address] Z43.6    Unstable angina ([address] I20.0    CKD (chronic kidney disease), stage III N18.3    Chronic diastolic heart failure (HCC) I50.32    Cervical radiculopathy M54.12    BMI 33.0-33.9,adult Z68.33    ICH (intracerebral hemorrhage) ([facility]) I61.9    Respiratory failure ([address] J96.90    UTI (urinary tract infection) N39.0    Encephalopathy G93.40   ICH: \"Large right cerebral hemorrhage most likely a hemorrhagic stroke with  intraventricular hemorrhage, subfalcine and uncal herniation and entrapped right  temporal horn\"  · Respiratory (Airways Instability) Faiiure  · On Systemic Anticoagulation: Eliquis + Coumadin + ASA (?)  · Suprathrapeutic INR > 6.  Prolonged PTT  · A Fib: Cardioverted at least once in the past (10/9/12)  · CAD: H/O UNSTABLE ANGINA  · LVDD  · HTN  · UTI: PER IM  · H/O BLADDER CA  · IBS  · Mild Anemia: lost 1.7 g of Hgb over 11 months  · CKD III  · Mild Hyperchloremia      RECOMMENDATIONS:   · Translaryngeal Intubation at [facility] ER by Dr Ailyn Garcia for unstable airway NOT Lung Parenchyma Failure  · MV initiated at [facility]  · Placed here on a P-B 840 MV: Settings reviewed with RRT  · Femoral CVP attemtps at[facility] Leonardo NP and Ailyn Garcia MD  · FFP reversal initiated by Dr Daily Charlton here at [facility]  · Teleneurology Consult done at [facility] (Read)  · Received Keppra and Mannitol at [facility]  · BP Control: IV Labetalol or IV Nicardipine: Per IM  · Gastric Proph: PPI  · DVT Proph : SCDs  · Anemia (Mild): lost 1.7 g of Hgb over 11 months: Repeat Blood work  · Repeat Brain CT   · IM started IV Ceftriaxone for UTI: I advice to change to Cefotaxime due to Thiotetrazole ring present in Ceftriaxone and this can prolong the PT INR  · Neurology Consult per Teleneurology  · Electrolyte Protocol  · CODE STATUS : \"Full\" per Dr Kiet Landon and NO CPR per Dr Feliciano Tsai note and her son was the decision maker: NEEDS clarification  · Dr Kali Rodrigues will continue her [facility] Care     Subjective/History: This patient has been seen and evaluated at the request of Madelyn Apple for MV Management of this patient in Coma due to a large ICH. Patient is a 82 y.o. female unknown to me with a very extensive PMH: A Fib, cardioverted to RSR in 8102 , LV Diastolic Dysfunction, HTN, IBS, Bladder CA (Treated) COPD, Depression, Diarrhea, stage III CKD, OA, CAD, Unstable Angina, on systemic anticoagulation: ASA + COUMADIN + ELIQUIS (?), apparently, per notes, last seen \"normal\" at 1130 PM last night. Later C/O headache and showed signs of CVA, she was taken to [facility], a CT there showed:  \"Large right cerebral hemorrhage most likely a hemorrhagic stroke with  intraventricular hemorrhage, subfalcine and uncal herniation and entrapped right temporal horn\". Dr Ammon Mcmahan saw her, teleneurology was consulted (consult read), given Mannitol + Keppra and translaryngeally intubated by Dr Ammon Mcmahan, MV initiated at [facility], CVP Attempts by Romy Bella NP and Dr Saintclair Sero Femoral vein\": unsuccesful, stabilized and transferred to [facility] as the [facility] admission was \"declined by hospitalist as she is not a [employer]\" per Dr Feliciano Tsai note, also per his note he discussed with her NOK: (SON) and per son Dr Ammon Mcmahan documented that he does NOT want CPR for his mother. Dr Sammie Steele note states that she is a \"full code\".    Upon arrival to [facility]: she is comatose and was placed on a P-B 840 MV, settings reviewed with RRT, all labs and x rays, Echos, EKGs, BW, reports from [facility] were reviewed. Remains comatose, admitted to ICU with high severity of illness and very poor prognosis. I have NOT seen any family members personally. Dr Mra Wood and the incoming ICU team will continue her care. D/w her [address], standard discussion no further orders requested from her and she has NOT seen ANY family members so far. The patient is critically ill and can not provide additional history due to: Coma, Intubated and Mechanically Ventilated    Past Medical History:   Diagnosis Date    Arthritis     [facility]     Atrial fibrillation (HCC)      in sr post cardioversion off multaq due to bradycardia    Back pain     BMI 33.0-33.9,adult 5/22/2017    Cancer ([facility] 1997    bladder    COPD (chronic obstructive pulmonary disease) (HCC)     Depression     Diarrhea     Essential hypertension, benign     STABLE    Glaucoma     Hip pain     Hypertension     IBS (irritable bowel syndrome)     Leg pain     Malignant neoplasm of bladder     Menopause     Mood disorder ([facility] 12/17/97    due to major surgery, mixed depressive anxious features      Past Surgical History:   Procedure Laterality Date    CYSTOSCOPY  11/11/97    excision of a suburethral mass    EGD  12/19/97    HX BREAST BIOPSY Left 1998    benign    HX HYSTERECTOMY      HX OTHER SURGICAL      cervical disc surgery    HX UROLOGICAL  1997    bladder removed; ileostomy    HX UROLOGICAL  1/6/98    Vaginal examination under anesthesia, removal of vaginal packing and irrigation tubing. Injection of radio-opaque contrast and fluoroscopy.  HX UROLOGICAL  12/23/97    Debridement of necrotic portion gracilis flap, vaginal vault including skin and subcutaneous fat, cautery and application of vaginal packing. Prior to Admission medications    Medication Sig Start Date End Date Taking?  Authorizing Provider   apixaban (ELIQUIS) 5 mg tablet Take 5 mg by mouth two (2) times a day. Historical Provider   warfarin (COUMADIN) 5 mg tablet Take 1 Tab by mouth daily. 18   Suzie Head MD   allopurinol (ZYLOPRIM) 100 mg tablet Take  by mouth daily. Historical Provider   valsartan-hydrochlorothiazide (DIOVAN-HCT) 80-12.5 mg per tablet Take 1 Tab by mouth daily. Ayaz Cotton MD   gabapentin (NEURONTIN) 300 mg capsule Take 300 mg by mouth. Ayaz Cotton MD   aspirin 81 mg chewable tablet Take 1 Tab by mouth daily. 13   Venessa Fuller MD     Current Facility-Administered Medications   Medication Dose Route Frequency     Allergies   Allergen Reactions    Dairy-Aid Unknown (comments)     Dairy products      Lyrica [Pregabalin] Other (comments)     lightheaded    Vicodin [Hydrocodone-Acetaminophen] Nausea Only      Social History   Substance Use Topics    Smoking status: Current Some Day Smoker     Packs/day: 0.25    Smokeless tobacco: Never Used    Alcohol use No      Family History   Problem Relation Age of Onset    Heart Disease Neg Hx         Review of Systems:  CANNOT BE OBTAINED    Objective:   Vital Signs:    Visit Vitals    BP 91/62    Pulse 87    Resp 14    SpO2 100%       O2 Device: Ventilator       Temp (24hrs), Av.8 °F (35.4 °C), Min:95.8 °F (35.4 °C), Max:95.8 °F (35.4 °C)       Intake/Output:   Last shift:         Last 3 shifts:    No intake or output data in the 24 hours ending 18 0540  Hemodynamics:   . @MAP     . @CVP       Ventilator Settings:  Mode Rate Tidal Volume Pressure FiO2 PEEP   VC+   500 ml    50 % 5 cm H20     Peak airway pressure: 19 cm H2O    Minute ventilation: 7.44 l/min      ARDS network Guidelines: Lung protective strategy and Pl pressure goals____________    Physical Exam:    General:  Comatose. Head:  Normocephalic, without obvious abnormality, atraumatic. Eyes:  EOMs cannot be assessed, pupils are fixed   Nose: Nares normal. Septum midline. Mucosa normal. No drainage.      Throat: ETT in place   Neck: Supple, symmetrical, trachea midline, no adenopathy, thyroid: no enlargment/tenderness/nodules, no carotid bruit and no JVD. Back:   Symmetric, no curvature. ROM normal.   Lungs:   Clear to auscultation bilaterally. Chest wall:  No tenderness or deformity. Heart:  Regular rate and rhythm, S1, S2 normal, no murmur, click, rub or gallop. Abdomen:   Soft, non-tender. Bowel sounds normal. No masses,  No organomegaly. Extremities: Extremities normal, atraumatic, no cyanosis or edema. Pulses: 2+ , irregular, and symmetric all extremities. Skin: Skin color, texture, turgor normal. No rashes or lesions   Lymph nodes: Cervical, supraclavicular, and axillary nodes normal.   Neurologic: Comatose, no SZ activity, fixed pupils. Data:     Recent Results (from the past 24 hour(s))   CBC WITH AUTOMATED DIFF    Collection Time: 05/28/18 12:25 AM   Result Value Ref Range    WBC 8.4 4.6 - 13.2 K/uL    RBC 3.67 (L) 4.20 - 5.30 M/uL    HGB 11.6 (L) 12.0 - 16.0 g/dL    HCT 33.5 (L) 35.0 - 45.0 %    MCV 91.3 74.0 - 97.0 FL    MCH 31.6 24.0 - 34.0 PG    MCHC 34.6 31.0 - 37.0 g/dL    RDW 16.6 (H) 11.6 - 14.5 %    PLATELET 799 667 - 243 K/uL    MPV 10.0 9.2 - 11.8 FL    NEUTROPHILS 32 (L) 40 - 73 %    LYMPHOCYTES 55 (H) 21 - 52 %    MONOCYTES 9 3 - 10 %    EOSINOPHILS 4 0 - 5 %    BASOPHILS 0 0 - 2 %    ABS. NEUTROPHILS 2.7 1.8 - 8.0 K/UL    ABS. LYMPHOCYTES 4.6 (H) 0.9 - 3.6 K/UL    ABS. MONOCYTES 0.8 0.05 - 1.2 K/UL    ABS. EOSINOPHILS 0.4 0.0 - 0.4 K/UL    ABS.  BASOPHILS 0.0 0.0 - 0.1 K/UL    DF AUTOMATED     METABOLIC PANEL, BASIC    Collection Time: 05/28/18 12:25 AM   Result Value Ref Range    Sodium 142 136 - 145 mmol/L    Potassium 3.9 3.5 - 5.5 mmol/L    Chloride 111 (H) 100 - 108 mmol/L    CO2 21 21 - 32 mmol/L    Anion gap 10 3.0 - 18 mmol/L    Glucose 118 (H) 74 - 99 mg/dL    BUN 26 (H) 7.0 - 18 MG/DL    Creatinine 2.28 (H) 0.6 - 1.3 MG/DL    BUN/Creatinine ratio 11 (L) 12 - 20      GFR est AA 25 (L) >60 ml/min/1.73m2 GFR est non-AA 21 (L) >60 ml/min/1.73m2    Calcium 8.4 (L) 8.5 - 10.1 MG/DL   PTT    Collection Time: 05/28/18 12:25 AM   Result Value Ref Range    aPTT 92.6 (H) 23.0 - 36.4 SEC   PROTHROMBIN TIME + INR    Collection Time: 05/28/18 12:25 AM   Result Value Ref Range    Prothrombin time 56.6 (H) 11.5 - 15.2 sec    INR 6.6 (HH) 0.8 - 1.2     GLUCOSE, POC    Collection Time: 05/28/18 12:38 AM   Result Value Ref Range    Glucose (POC) 133 (H) 70 - 110 mg/dL   POC G3    Collection Time: 05/28/18  1:32 AM   Result Value Ref Range    Device: VENT      FIO2 (POC) 100 %    pH (POC) 7.370 7.35 - 7.45      pCO2 (POC) 36.4 35.0 - 45.0 MMHG    pO2 (POC) 385 (H) 80 - 100 MMHG    HCO3 (POC) 21.1 (L) 22 - 26 MMOL/L    sO2 (POC) 100 (H) 92 - 97 %    Base deficit (POC) 4 mmol/L    Mode ASSIST CONTROL      Tidal volume 500 ml    Set Rate 14 bpm    PEEP/CPAP (POC) 5 cmH2O    Allens test (POC) YES      Inspiratory Time 1.25 sec    Total resp.  rate 14      Site RIGHT RADIAL      Specimen type (POC) ARTERIAL      Performed by Sophia Coronel     Volume control plus YES     URINALYSIS W/ RFLX MICROSCOPIC    Collection Time: 05/28/18  2:45 AM   Result Value Ref Range    Color YELLOW      Appearance CLOUDY      Specific gravity 1.014 1.005 - 1.030      pH (UA) 5.5 5.0 - 8.0      Protein TRACE (A) NEG mg/dL    Glucose NEGATIVE  NEG mg/dL    Ketone NEGATIVE  NEG mg/dL    Bilirubin NEGATIVE  NEG      Blood MODERATE (A) NEG      Urobilinogen 1.0 0.2 - 1.0 EU/dL    Nitrites NEGATIVE  NEG      Leukocyte Esterase LARGE (A) NEG     URINE MICROSCOPIC ONLY    Collection Time: 05/28/18  2:45 AM   Result Value Ref Range    WBC TOO NUMEROUS TO COUNT 0 - 4 /hpf    RBC  0 - 5 /hpf     UNABLE TO QUANTITATE MICROSCOPIC PARAMETERS DUE TO EXCESSIVE WBCS    Bacteria 4+ (A) NEG /hpf   EKG, 12 LEAD, INITIAL    Collection Time: 05/28/18  2:56 AM   Result Value Ref Range    Ventricular Rate 85 BPM    Atrial Rate 97 BPM    QRS Duration 92 ms    Q-T Interval 402 ms QTC Calculation (Bezet) 478 ms    Calculated P Axis 66 degrees    Calculated R Axis 46 degrees    Calculated T Axis 26 degrees    Diagnosis       Undetermined rhythm  Otherwise normal ECG  When compared with ECG of 18-OCT-2014 07:25,  Current undetermined rhythm precludes rhythm comparison, needs review               Telemetry: A fib with CVR per Rhythm and 12 lead EKG    Imaging:  I have personally reviewed the patients radiographs and have reviewed the reports:  ALL N0TES READ/ALL LABS, XRAYS, ECHOS, CT, ETC FROM [place] [id number] / Discussed with her  Neuro ICU RN , orders reviewed / NO family members available at the ICU, she has NOT seen any family so far. No new orders requested. Best practice :D/W Dr Nguyen Ramos, teleneurology Consult read, ALL ER notes read.  NO family members available to me     Total critical care time exclusive of procedures: 60 minutes  Vilma Moffett MD

## 2018-05-28 NOTE — PROGRESS NOTES
Reason for Admission:   ICH                   RRAT Score:          11           Plan for utilizing home health:      TBD. Likelihood of Readmission:   Mod/yellow. Impaired self-care                         Transition of Care Plan:          Reason for Admission:                      RRAT Score:                     Plan for utilizing home health:                          Likelihood of Readmission:                           Transition of Care Plan:                 Patient unable to participate in the interview. POA listed Leonardo Alan phone was not working [phone]. Spoke to son, Kristin Hernandez, who is the patient's caregiver and lives with her. He stated Ms. Muriel Fernandez is a family friend and that she has moved to [place] - he is unsure about contact info. Verified demographics listed on face sheet; all information correct. PCP is Dr. Miriam Moya last seen in March . Patient lives with her son. . Patient has two adult children Kristin Hernandez and Shanon Vidal ([phone] who lives in [place] and plans to be here mid-week. Patient required assistance with ADLs prior to admission, mostly cooking, bathing and changing her urostomy bag. She used a walker and urostomy supplies at home. Discharge plan is pending. Patient has designated unable to participate in his/her discharge plan and to receive any needed information. Name:   Address:  Phone number:    Care Management Interventions  PCP Verified by CM: Yes (Dr. Miriam Moya)  Transition of Care Consult (CM Consult): Discharge Planning  Current Support Network: Lives with Caregiver  Confirm Follow Up Transport: Family  Plan discussed with Pt/Family/Caregiver:  Yes

## 2018-05-28 NOTE — ED PROVIDER NOTES
EMERGENCY DEPARTMENT HISTORY AND PHYSICAL EXAM    12:35 AM      Date: 5/28/2018  Patient Name: Stormy Hendrix    History of Presenting Illness     No chief complaint on file. HPI limited due to patient unresponsiveness     History Provided By: EMS    Chief Complaint: Weakness  Duration: This evening  Timing:  N/A  Location: Left sided  Quality: N/A  Severity: N/A  Modifying Factors: N/A  Associated Symptoms: Headache      Additional History (Context): Stormy Hendrix is a 82 y.o. female with a history of A-fib who presents with left sided weakness this evening. The patient last known normal was 23:50. EMS reports the patient was complaining of severe headache. The patient had left sided facial droop and slurred speech. EMS states the patient's blood sugar was 110. The patient is currently on coumadin. PCP: Junaid Chu MD    Current Facility-Administered Medications   Medication Dose Route Frequency Provider Last Rate Last Dose    0.9% sodium chloride infusion 250 mL  250 mL IntraVENous PRN Leodan Morejon MD        levETIRAcetam in NaCl (iso-os) (KEPPRA) 1,000 mg/100 mL IVPB premix pgbk              Current Outpatient Prescriptions   Medication Sig Dispense Refill    apixaban (ELIQUIS) 5 mg tablet Take 5 mg by mouth two (2) times a day.  warfarin (COUMADIN) 5 mg tablet Take 1 Tab by mouth daily. 60 Tab 3    allopurinol (ZYLOPRIM) 100 mg tablet Take  by mouth daily.  valsartan-hydrochlorothiazide (DIOVAN-HCT) 80-12.5 mg per tablet Take 1 Tab by mouth daily.  gabapentin (NEURONTIN) 300 mg capsule Take 300 mg by mouth.  aspirin 81 mg chewable tablet Take 1 Tab by mouth daily.  30 Tab 3       Past History     Past Medical History:  Past Medical History:   Diagnosis Date    Arthritis     Atrial fibrillation ([facility]     Atrial fibrillation (HCC)      in sr post cardioversion off multaq due to bradycardia    Back pain     BMI 33.0-33.9,adult 5/22/2017    Cancer ([facility] 1997    bladder  COPD (chronic obstructive pulmonary disease) (HCC)     Depression     Diarrhea     Essential hypertension, benign     STABLE    Glaucoma     Hip pain     Hypertension     IBS (irritable bowel syndrome)     Leg pain     Malignant neoplasm of bladder     Menopause     Mood disorder ([address] 12/17/97    due to major surgery, mixed depressive anxious features       Past Surgical History:  Past Surgical History:   Procedure Laterality Date    CYSTOSCOPY  11/11/97    excision of a suburethral mass    EGD  12/19/97    HX BREAST BIOPSY Left 1998    benign    HX HYSTERECTOMY      HX OTHER SURGICAL      cervical disc surgery    HX UROLOGICAL  1997    bladder removed; ileostomy    HX UROLOGICAL  1/6/98    Vaginal examination under anesthesia, removal of vaginal packing and irrigation tubing. Injection of radio-opaque contrast and fluoroscopy.  HX UROLOGICAL  12/23/97    Debridement of necrotic portion gracilis flap, vaginal vault including skin and subcutaneous fat, cautery and application of vaginal packing. Family History:  Family History   Problem Relation Age of Onset    Heart Disease Neg Hx        Social History:  Social History   Substance Use Topics    Smoking status: Current Some Day Smoker     Packs/day: 0.25    Smokeless tobacco: Never Used    Alcohol use No       Allergies: Allergies   Allergen Reactions    Dairy-Aid Unknown (comments)     Dairy products      Lyrica [Pregabalin] Other (comments)     lightheaded    Vicodin [Hydrocodone-Acetaminophen] Nausea Only         Review of Systems       Review of Systems   Unable to perform ROS: Patient unresponsive   Neurological: Positive for weakness and headaches.          Physical Exam     Visit Vitals    /75    Pulse 87    Temp 95.8 °F (35.4 °C)    Resp 21    SpO2 100%         Physical Exam      Diagnostic Study Results     Labs -  Recent Results (from the past 12 hour(s))   CBC WITH AUTOMATED DIFF    Collection Time: 05/28/18 12:25 AM   Result Value Ref Range    WBC 8.4 4.6 - 13.2 K/uL    RBC 3.67 (L) 4.20 - 5.30 M/uL    HGB 11.6 (L) 12.0 - 16.0 g/dL    HCT 33.5 (L) 35.0 - 45.0 %    MCV 91.3 74.0 - 97.0 FL    MCH 31.6 24.0 - 34.0 PG    MCHC 34.6 31.0 - 37.0 g/dL    RDW 16.6 (H) 11.6 - 14.5 %    PLATELET 037 125 - 002 K/uL    MPV 10.0 9.2 - 11.8 FL    NEUTROPHILS 32 (L) 40 - 73 %    LYMPHOCYTES 55 (H) 21 - 52 %    MONOCYTES 9 3 - 10 %    EOSINOPHILS 4 0 - 5 %    BASOPHILS 0 0 - 2 %    ABS. NEUTROPHILS 2.7 1.8 - 8.0 K/UL    ABS. LYMPHOCYTES 4.6 (H) 0.9 - 3.6 K/UL    ABS. MONOCYTES 0.8 0.05 - 1.2 K/UL    ABS. EOSINOPHILS 0.4 0.0 - 0.4 K/UL    ABS.  BASOPHILS 0.0 0.0 - 0.1 K/UL    DF AUTOMATED     METABOLIC PANEL, BASIC    Collection Time: 05/28/18 12:25 AM   Result Value Ref Range    Sodium 142 136 - 145 mmol/L    Potassium 3.9 3.5 - 5.5 mmol/L    Chloride 111 (H) 100 - 108 mmol/L    CO2 21 21 - 32 mmol/L    Anion gap 10 3.0 - 18 mmol/L    Glucose 118 (H) 74 - 99 mg/dL    BUN 26 (H) 7.0 - 18 MG/DL    Creatinine 2.28 (H) 0.6 - 1.3 MG/DL    BUN/Creatinine ratio 11 (L) 12 - 20      GFR est AA 25 (L) >60 ml/min/1.73m2    GFR est non-AA 21 (L) >60 ml/min/1.73m2    Calcium 8.4 (L) 8.5 - 10.1 MG/DL   PTT    Collection Time: 05/28/18 12:25 AM   Result Value Ref Range    aPTT 92.6 (H) 23.0 - 36.4 SEC   PROTHROMBIN TIME + INR    Collection Time: 05/28/18 12:25 AM   Result Value Ref Range    Prothrombin time 56.6 (H) 11.5 - 15.2 sec    INR 6.6 (HH) 0.8 - 1.2     GLUCOSE, POC    Collection Time: 05/28/18 12:38 AM   Result Value Ref Range    Glucose (POC) 133 (H) 70 - 110 mg/dL   POC G3    Collection Time: 05/28/18  1:32 AM   Result Value Ref Range    Device: VENT      FIO2 (POC) 100 %    pH (POC) 7.370 7.35 - 7.45      pCO2 (POC) 36.4 35.0 - 45.0 MMHG    pO2 (POC) 385 (H) 80 - 100 MMHG    HCO3 (POC) 21.1 (L) 22 - 26 MMOL/L    sO2 (POC) 100 (H) 92 - 97 %    Base deficit (POC) 4 mmol/L    Mode ASSIST CONTROL      Tidal volume 500 ml    Set Rate 14 bpm    PEEP/CPAP (POC) 5 cmH2O    Allens test (POC) YES      Inspiratory Time 1.25 sec    Total resp. rate 14      Site RIGHT RADIAL      Specimen type (POC) ARTERIAL      Performed by Sky Victor     Volume control plus YES     URINALYSIS W/ RFLX MICROSCOPIC    Collection Time: 05/28/18  2:45 AM   Result Value Ref Range    Color YELLOW      Appearance CLOUDY      Specific gravity 1.014 1.005 - 1.030      pH (UA) 5.5 5.0 - 8.0      Protein TRACE (A) NEG mg/dL    Glucose NEGATIVE  NEG mg/dL    Ketone NEGATIVE  NEG mg/dL    Bilirubin NEGATIVE  NEG      Blood MODERATE (A) NEG      Urobilinogen 1.0 0.2 - 1.0 EU/dL    Nitrites NEGATIVE  NEG      Leukocyte Esterase LARGE (A) NEG     URINE MICROSCOPIC ONLY    Collection Time: 05/28/18  2:45 AM   Result Value Ref Range    WBC TOO NUMEROUS TO COUNT 0 - 4 /hpf    RBC  0 - 5 /hpf     UNABLE TO QUANTITATE MICROSCOPIC PARAMETERS DUE TO EXCESSIVE WBCS    Bacteria 4+ (A) NEG /hpf   EKG, 12 LEAD, INITIAL    Collection Time: 05/28/18  2:56 AM   Result Value Ref Range    Ventricular Rate 85 BPM    Atrial Rate 97 BPM    QRS Duration 92 ms    Q-T Interval 402 ms    QTC Calculation (Bezet) 478 ms    Calculated P Axis 66 degrees    Calculated R Axis 46 degrees    Calculated T Axis 26 degrees    Diagnosis       Undetermined rhythm  Otherwise normal ECG  When compared with ECG of 18-OCT-2014 07:25,  Current undetermined rhythm precludes rhythm comparison, needs review         Radiologic Studies -   CT HEAD WO CONT   Final Result      XR CHEST PORT    (Results Pending)         Medical Decision Making   I am the first provider for this patient. I reviewed the vital signs, available nursing notes, past medical history, past surgical history, family history and social history. Vital Signs-Reviewed the patient's vital signs. Physical exam:  General:  Well-developed, well-nourished, obtunded laying back on stretcher protecting her airway at this time.     Head:  Normocephalic atraumatic. Eyes:  Pupils 5 mm  extraocular movements intact. No pallor or conjunctival injection. Nose:  No rhinorrhea, inspection grossly normal.    Ears:  Grossly normal to inspection, no discharge. Mouth:  Mucous membranes moist, no appreciable intraoral lesion. Neck/Back:  Trachea midline, no asymmetry. Chest:  Grossly normal inspection, symmetric chest rise. Pulmonary:  Clear to auscultation bilaterally no wheezes rhonchi or rales. Cardiovascular:  S1-S2 no murmurs rubs or gallops. Abdomen: Nondistended. Urostomy present with yellow urine  Extremities:  Grossly normal to inspection, peripheral pulses intact  in all 4 extremities  Neurologic:  Obtunded, moves RIGHT leg to command, moves RIGHT arm to command, nonverbal  Skin:  Warm and dry  Nursing note reviewed, vital signs reviewed. ED course:  Patient presents with a acute onset of left-sided weakness. She has had a decompensation according to EMS in route. She was seen immediately upon arrival activate a code S, most concerning for intracranial hemorrhage given her history of progressing symptoms and anticoagulation usage. She did have a headache prior to the onset of her symptoms. Consult:  Discussed care with Dr. Felicia Ho. Standard discussion; including history of patients chief complaint, available diagnostic results, and treatment course. CT head per radiology interparenchymal hemorrhage with shift,    Patient seen at bedside by tele-neurology, recommends mannitol, Keppra here, admitted to [facility] if possible .   Admission was declined by hospitalist since this patient is not [address] currently    Son is at bedside, he is making decisions for the patient, he reports no prior DO NOT RESUSCITATE,  patient would want a trial of maximal therapy by his report, agrees for no chest compressions should her catastrophic intracranial hemorrhage progress to a cardiac arrest    Since she is having progressing altered mental status and now comatose, we'll proceed with intubation    Procedure note:  Orotracheal Intubation  Consent:  Emergent  Indication:  Respiratory failure  Performed by:  Myself  Intubation method:  Direct laryngoscopy  Patient status:  Rapid sequence intubation  Preoxygenation:  Nonrebreather mask and BVM  Medications used:  Rocuronium and etomidate  Sedation:  Propofol  Laryngoscope: Mac 4  Tube size:  7.5  Depth: 23 cm at lip  Attempts: 1  Postprocedure assessment:  Chest rise equal, end-tidal CO2 and positive color change, breath sounds are equal bilaterally and absent over the epigastrium. Chest x-ray:  ordered  Patient tolerated procedure well  Total time: 5 minutes    Attempt a femoral line placement, but could not thread the guidewire, possibly related to her history of cervical cancer    Labs significant for a supratherapeutic INR 6.6, ordered FFP 2 units    Patient reevaluated, blood pressure in the 150s, requiring no sedation has intermittent jerking motion    Transfer arranged to [facility] emergency department accepted by Dr. Laura Jordan    Disposition:    Transfer to [facility] ER      Portions of this chart were created with Dragon medical speech to text program.   Unrecognized errors may be present. I have spent 65 minutes of critical care time (excluding time for other separate services) involved in lab review, consultations with specialist, family decision-making, and documentation. During this entire length of time I was immediately available to the patient. Critical Care: The reason for providing this level of medical care for this critically ill patient was due a critical illness that impaired one or more vital organ systems such that there was a high probability of imminent or life threatening deterioration in the patients condition.  This care involved high complexity decision making to assess, manipulate, and support vital system functions, to treat this degree of vital organ system failure and to prevent further life threatening deterioration of the patients condition. Follow-up Information     None           Patient's Medications   Start Taking    No medications on file   Continue Taking    ALLOPURINOL (ZYLOPRIM) 100 MG TABLET    Take  by mouth daily. APIXABAN (ELIQUIS) 5 MG TABLET    Take 5 mg by mouth two (2) times a day. ASPIRIN 81 MG CHEWABLE TABLET    Take 1 Tab by mouth daily. GABAPENTIN (NEURONTIN) 300 MG CAPSULE    Take 300 mg by mouth. VALSARTAN-HYDROCHLOROTHIAZIDE (DIOVAN-HCT) 80-12.5 MG PER TABLET    Take 1 Tab by mouth daily. WARFARIN (COUMADIN) 5 MG TABLET    Take 1 Tab by mouth daily. These Medications have changed    No medications on file   Stop Taking    No medications on file     _______________________________    Attestations:  Fernanda Lozano MD acting as a scribe for and in the presence of Cristine Bowen MD      May 28, 2018 at 3:26 AM       Provider Attestation:      I personally performed the services described in the documentation, reviewed the documentation, as recorded by the scribe in my presence, and it accurately and completely records my words and actions.  May 28, 2018 at 3:26 AM - Cristine Bowen MD    _______________________________

## 2018-05-28 NOTE — PROGRESS NOTES
0400,received pt from [facility] on emt transport vent,pt placed on 840 vent settings AC/VC+ 14/500/50%/5peep pt resting with eyes closed. 0610,transported pt to CT and then to 2600 w/o problem.

## 2018-05-28 NOTE — PROGRESS NOTES
Problem: Ventilator Management  Goal: *Adequate oxygenation and ventilation  VENTILATOR Care plan    Problem: Ventilator Management  Goal: *Adequate oxygenation/ ventilation/ and extubation      Patient:        Crispin Unger     82 y.o.   female     5/28/2018  1:43 PM  Patient Active Problem List   Diagnosis Code    Hypertension I10    AF (atrial fibrillation) (HCC) I48.91    Depression F32.9    IBS (irritable bowel syndrome) K58.9    Hx of bladder cancer Z85.51    Attention to urostomy ([address] Z43.6    Unstable angina ([facility]) I20.0    CKD (chronic kidney disease), stage III N18.3    Chronic diastolic heart failure (HCC) I50.32    Cervical radiculopathy M54.12    BMI 33.0-33.9,adult Z68.33    ICH (intracerebral hemorrhage) ([facility]) I61.9    Respiratory failure ([facility]) J96.90    UTI (urinary tract infection) N39.0    Encephalopathy G93.40       ICH (intracerebral hemorrhage) (HCC)  ICH (intracerebral hemorrhage) ([facility])  Respiratory failure ([facility])  Encephalopathy  UTI (urinary tract infection)    Reason patient intubated:unresponsive    Ventilator day:day 1 transferred from [facility]    Ventilator settings:AC/VC+ 14/500/5/35%    ETT Size/Placement:7.5mm       ABG:  Date:5/28/2018  No results found for: PH, PHI, PCO2, PCO2I, PO2, PO2I, HCO3, HCO3I, FIO2, FIO2I    Chest X-ray:  Date:5/28/2018    Results from Hospital Encounter encounter on 05/28/18   XR CHEST PORT   Narrative EXAM: Chest portable    INDICATION: Respiratory failure. Intubated    COMPARISON: 5/28/2018    _______________    FINDINGS:    AP portable chest film was performed. Endotracheal tube and NG tube are present  in good position. Poor inspiration. Persistent right lower lobe consolidation which could  represent atelectasis or infiltrate. Chest is otherwise stable.    _______________         Impression IMPRESSION:      1. Lines and tubes in good position.   2. Poor inspiration with persistent right lower lobe consolidation which appears  slightly improved.           Lab Test:  Date:5/28/2018  WBC:   Lab Results   Component Value Date/Time    WBC 9.5 05/28/2018 10:50 AM   HGB: Lab Results   Component Value Date/Time    HGB 9.2 (L) 05/28/2018 10:50 AM    PLTS: Lab Results   Component Value Date/Time    PLATELET 003 91/55/4589 10:50 AM       SaO2%/flow:   SpO2 Readings from Last 1 Encounters:   05/28/18 100%       Vital Signs:   Patient Vitals for the past 8 hrs:   Temp Pulse Resp BP SpO2   05/28/18 1315 (!) 93.5 °F (34.2 °C) 81 14 (!) 79/37 100 %   05/28/18 1310 - 84 14 - 100 %   05/28/18 1300 (!) 93.4 °F (34.1 °C) 82 14 95/62 100 %   05/28/18 1245 (!) 93.4 °F (34.1 °C) 87 14 101/57 100 %   05/28/18 1230 (!) 93.3 °F (34.1 °C) 92 14 (!) 94/36 100 %   05/28/18 1215 (!) 93.3 °F (34.1 °C) (!) 101 14 (!) 87/51 100 %   05/28/18 1200 (!) 93.3 °F (34.1 °C) (!) 109 14 99/64 100 %   05/28/18 1145 (!) 93.3 °F (34.1 °C) (!) 108 14 105/57 100 %   05/28/18 1130 (!) 93.3 °F (34.1 °C) (!) 112 14 113/44 100 %   05/28/18 1115 (!) 93.3 °F (34.1 °C) (!) 115 14 107/58 100 %   05/28/18 1100 (!) 93.4 °F (34.1 °C) (!) 124 14 (!) 118/103 100 %   05/28/18 1030 (!) 93.3 °F (34.1 °C) (!) 126 14 146/78 100 %   05/28/18 1015 (!) 93.5 °F (34.2 °C) (!) 122 14 97/45 100 %   05/28/18 0957 - (!) 109 14 - 100 %   05/28/18 0945 (!) 93.6 °F (34.2 °C) (!) 113 14 (!) 105/39 100 %   05/28/18 0930 (!) 93.7 °F (34.3 °C) (!) 116 14 (!) 88/47 100 %   05/28/18 0919 (!) 93.9 °F (34.4 °C) (!) 119 14 107/42 100 %   05/28/18 0915 (!) 94 °F (34.4 °C) (!) 119 14 (!) 86/30 100 %   05/28/18 0845 (!) 94.4 °F (34.7 °C) (!) 127 15 92/42 100 %   05/28/18 0834 (!) 94.3 °F (34.6 °C) (!) 117 13 97/41 100 %   05/28/18 0830 (!) 94.3 °F (34.6 °C) (!) 114 15 (!) 81/46 100 %   05/28/18 0822 (!) 94.4 °F (34.7 °C) (!) 114 15 (!) 70/28 -   05/28/18 0815 (!) 94.4 °F (34.7 °C) (!) 117 16 (!) 80/36 -   05/28/18 0800 (!) 94.4 °F (34.7 °C) (!) 121 13 99/82 -   05/28/18 0745 (!) 94.4 °F (34.7 °C) (!) 120 13 90/68 -   05/28/18 0731 (!) 94.3 °F (34.6 °C) (!) 125 16 (!) 86/63 -   05/28/18 0715 (!) 94.5 °F (34.7 °C) (!) 121 15 115/84 99 %   05/28/18 0700 (!) 94.6 °F (34.8 °C) 99 15 117/72 -   05/28/18 0659 (!) 94.6 °F (34.8 °C) 100 14 - 100 %   05/28/18 0545 - 98 - 101/73 100 %       Wean Screen Pass (Yes or No):yes  Wean Screen Reason for Failure:pt went apneic  Duration of Weaning Trial:  Additional Comments:        PLAN OF CARE:continue plan of care as per MD       GOAL:oxygenation/ventilation/airway protectin      OUTCOME: No

## 2018-05-28 NOTE — PROGRESS NOTES
PT order received, chart reviewed. Per discussion with PEYMAN Vines, pt is intubated and unresponsive to stimuli. Not appropriate for skilled therapy at this time. Will discontinue PT orders.     NOEL FowlerT

## 2018-05-28 NOTE — PROGRESS NOTES
4543 Assumed care of patient at change of shift. Patient a-fib on the monitor with HR ranging from the 110's-120's- will notify MD. Patient's pupils fixed at 3-4 with left pupil irregularly shaped. Patient reflexive withdraw slightly to BLE and extension to LUE. Patient has gag when suctioned on vent. Phlebotomy here and attempting to get am labs. CXR done this am. Mannitol infusing. First unit of FFP to be hung now. Patient received with OG tube inserted by receiving nurse due to vomit being on patient and set to low intermittent suction- output tan with brown specks. 4457 Notified intensivist that patient's SBP is in the 70's. Order for 500 ml NS bolus. FFP hanging now. 0910 Levophed started. Intensivist was going to place line for pressor but INR 6.6. Patient's son being called. 2293 levo increased to 8mcg/min    0922 Dr. Emerald Mcclellan spoke to patient's son on telephone to update on her condition. Patient's son to come to hospital in about an hour. [address] called to report GCS of 4. Rep stated they will not be pursuing organs but to call with time of death for record purposes. 1200 Levophed continues to infuse at 10mcg/min. Patient's son at bedside and Dr. Emerald Mcclellan called to notify. Patient gag/cough absent when suctioned. No withdrawal noted with neuro check. 1430 PICC nurse requested to place additional PIV's after multiple failed attempts. Right hand IV with levophed assessed Q 30min. MARTINEZ Reddy notified of INR of 2.2 and will ask intensivist if CVL should be attempted. 1615 Levophed switched to new 18G PIV on left upper arm. Positive blood return on line, no s/sx of infiltration. Right hand IV flushed and capped with no x/sx of infiltration. [employer] Dr. Emerald Mcclellan notified that patient's SBP read in the 180's and HR in the 130's-150's (afib).  Levophed shut off.    1715 BP 87/48 levophed restarted at 4mcg/min    1722 BP 44/27 Levophed increased to 10 mcg/min    1727 BP 77/47 levophed maxed at Chief Complaint   Patient presents with   • Establish Care     fasting. re-establish care from 8 years ago   • Depression     has had hot flashes for a long time and used to be on Effexor 75 mg. would like to go back on.        Medications: medications verified, no change  Refills needed today?  NO  Denies known Latex allergy or symptoms of Latex sensitivity.  Patient would like communication of their results via:    Home Phone: [phone] (home)  Okay to leave a message containing results? Yes  Tobacco history: verified      Health Maintenance Summary     Topic Due On Due Status Completed On Postpone Until Reason    Colorectal Cancer Screening - Colonoscopy Feb 19, 2016 Overdue       Pap Smear - Cervical Cancer Screening  Feb 19, 1996 Overdue       Immunization - TDAP Pregnancy  Hidden       IMMUNIZATION - DTaP/Tdap/Td Mar 4, 2024 Not Due Mar 4, 2014      Immunization-Influenza Sep 1, 2017 Postponed Oct 1, 2016 Oct 31, 2017 Patient Refused          Patient is due for topics as listed above, she wishes to decline at this time .        [facility] status addressed. Patient Inactive - Patient declined.         16 mcg/min. Dr. Georgette Dodson informed and no new orders received. 1738 /74 levophed titrated to 12 mcg/min. 65 Patient's son called for update. Informed son that patient is still unresponsive, is still not breathing over the vent, and is still requiring medication to maintain a viable BP. Mr. Jose Roberto Whitehead verbalized understanding. Bedside and Verbal shift change report given to Jordan Vivas RN (oncoming nurse) by Elois Leventhal, RN   (offgoing nurse). Report included the following information SBAR, Kardex, Intake/Output, MAR, Recent Results and Cardiac Rhythm afib-afib with RVR and NIHSS.

## 2018-05-28 NOTE — PROGRESS NOTES
Problem: Falls - Risk of  Goal: *Absence of Falls  Document Enrique Fall Risk and appropriate interventions in the flowsheet. Outcome: Progressing Towards Goal  Fall Risk Interventions:            Medication Interventions: Bed/chair exit alarm    Elimination Interventions: Toileting schedule/hourly rounds             Problem: Pressure Injury - Risk of  Goal: *Prevention of pressure injury  Document David Scale and appropriate interventions in the flowsheet. Outcome: Progressing Towards Goal  Pressure Injury Interventions:  Sensory Interventions: Assess changes in LOC, Keep linens dry and wrinkle-free, Minimize linen layers, Turn and reposition approx. every two hours (pillows and wedges if needed)    Moisture Interventions: Absorbent underpads    Activity Interventions: Pressure redistribution bed/mattress(bed type)    Mobility Interventions: Float heels, HOB 30 degrees or less, Pressure redistribution bed/mattress (bed type), Turn and reposition approx.  every two hours(pillow and wedges)    Nutrition Interventions: Discuss nutritional consult with provider    Friction and Shear Interventions: Foam dressings/transparent film/skin sealants, HOB 30 degrees or less, Lift sheet

## 2018-05-28 NOTE — PROGRESS NOTES
Pulmonary Progress Note    Pt admitted early this morning with an intracranial hemorrhage from a supratherapeutic INR. Based on EMR notes, the recommendation by teleneurology did not included neurosurgical consultation. By this morning the original CT brain without contrast showed that the midline shift had progressed by report from 9 to 13 mm. I contacted neurology--per teleneurology recommendation, then called neurosurgery directly thereafter. She did not respond to sternal rub. Pupils were not dilated, but did not respond. She was rapidly becoming hypotension and hypothermic. Neurosurgery believed that no intervention was reasonable given the clinical status. She was made full DNR per discussion with the pt's son. We will continue with peripheral pressor support pending his arrival.  Central access was not placed due to bleeding risk. Addendum  Her BP has responded to peripheral Levophed. Her neuro exam and hypothermia remain unchanged. Family has not yet arrived. The patient is critically ill with organ failure. Total critical care time without physician overlap or procedure time included. 80 minutes.

## 2018-05-28 NOTE — PROGRESS NOTES
0630> Assumed care. Patient is unresponsive, withdraws to BLE, no withdrawal to BUE. Pupils are fixed, no blink to threat. EKG completed, bathed, mepilex applied to sacrum for prevention. Split noted on her anus, right and left groin. 0710> Bedside and Verbal shift change report given to Rosalita Holter, RN (oncoming nurse) by Best Calvo RN (offgoing nurse). Report included the following information SBAR, Kardex, Intake/Output, MAR, Recent Results and Cardiac Rhythm AFIB.

## 2018-05-28 NOTE — PROGRESS NOTES
Hospitalist Progress Note  Ken Morris MD  Internal medicine/ Hospitalist    Daily Progress Note: 5/28/2018 7:49 AM      Interval history / Subjective: Ubaldo Granda is a 82 y.o. female with h/o afib,copd,hypertension,who presented as a transfer from [facility] intubated with hemorrhagic stroke. Patient was on Eliquis for AFIB . CT head shows large rt intraparenchymal cerebral hemorrhage inv ventricles. Tele neurology reported non surgical mgt. Prior to arrival in ED was intubated and given mannitol 100 gr and Keppra. Patient's prognosis is poor. I have discussed with son early over the phone and he has decided not to be aggressive. Son patient was made partial code. After the intensivist saw patient today and discussed the case with patient's son,the son decided that patient be made DNR.     Current Facility-Administered Medications   Medication Dose Route Frequency    0.9% sodium chloride infusion 250 mL  250 mL IntraVENous PRN    ELECTROLYTE REPLACEMENT PROTOCOL  1 Each Other Q8H    ELECTROLYTE REPLACEMENT PROTOCOL  1 Each Other Q8H    ELECTROLYTE REPLACEMENT PROTOCOL  1 Each Other Q8H    PHARMACY INFORMATION NOTE  1 Each Other DAILY    ondansetron (ZOFRAN) injection 2 mg  2 mg IntraVENous Q6H PRN    labetalol (NORMODYNE;TRANDATE) 20 mg/4 mL (5 mg/mL) injection 5 mg  5 mg IntraVENous Q15MIN PRN    acetaminophen (TYLENOL) tablet 650 mg  650 mg Oral Q4H PRN    acetaminophen (TYLENOL) suppository 650 mg  650 mg Rectal Q4H PRN    senna-docusate (PERICOLACE) 8.6-50 mg per tablet 2 Tab  2 Tab Oral QHS    bisacodyl (DULCOLAX) tablet 5 mg  5 mg Oral DAILY PRN    bisacodyl (DULCOLAX) suppository 10 mg  10 mg Rectal DAILY PRN    pantoprazole (PROTONIX) tablet 40 mg  40 mg Oral Q12H    Or    pantoprazole (PROTONIX) granules for oral suspension 40 mg  40 mg Oral Q12H    Or    pantoprazole (PROTONIX) 40 mg in sodium chloride 0.9% 10 mL injection  40 mg IntraVENous Q12H    albuterol (PROVENTIL VENTOLIN) nebulizer solution 2.5 mg  2.5 mg Nebulization J0G PRN    folic acid (FOLVITE) 1 mg, thiamine (B-1) 100 mg in 0.9% sodium chloride 50 mL ivpb   IntraVENous ACD    cefTRIAXone (ROCEPHIN) 1 g in 0.9% sodium chloride (MBP/ADV) 50 mL MBP  1 g IntraVENous Q24H    levETIRAcetam (KEPPRA) 500 mg in saline (iso-osm) 100 ml IVPB  500 mg IntraVENous Q12H    mannitol (OSMITROL) 20 % infusion 25 g  25 g IntraVENous Q6H    niCARdipine (CARDENE) 25 mg in 0.9% sodium chloride 250 mL infusion  0-15 mg/hr IntraVENous TITRATE        Review of Systems  A comprehensive review of systems was negative except for that written in the HPI. Objective:     Visit Vitals    /73    Pulse 98    Resp 14    Wt 98.6 kg (217 lb 6.4 oz)    SpO2 100%    BMI 36.18 kg/m2      O2 Device: Ventilator    Temp (24hrs), Av.8 °F (35.4 °C), Min:95.8 °F (35.4 °C), Max:95.8 °F (35.4 °C)  P/E  Intubated,unresponsive. Heent:pupils not reactive. ET tube present. Lungs ctab  Heart s1s2 nl,no m/g  Abdm:soft,not tender,bs present. Extr:no edema,good pedis pulses. Neuro:unresponsive,intubated. Data Review    Recent Results (from the past 12 hour(s))   CBC WITH AUTOMATED DIFF    Collection Time: 18 12:25 AM   Result Value Ref Range    WBC 8.4 4.6 - 13.2 K/uL    RBC 3.67 (L) 4.20 - 5.30 M/uL    HGB 11.6 (L) 12.0 - 16.0 g/dL    HCT 33.5 (L) 35.0 - 45.0 %    MCV 91.3 74.0 - 97.0 FL    MCH 31.6 24.0 - 34.0 PG    MCHC 34.6 31.0 - 37.0 g/dL    RDW 16.6 (H) 11.6 - 14.5 %    PLATELET 422 006 - 627 K/uL    MPV 10.0 9.2 - 11.8 FL    NEUTROPHILS 32 (L) 40 - 73 %    LYMPHOCYTES 55 (H) 21 - 52 %    MONOCYTES 9 3 - 10 %    EOSINOPHILS 4 0 - 5 %    BASOPHILS 0 0 - 2 %    ABS. NEUTROPHILS 2.7 1.8 - 8.0 K/UL    ABS. LYMPHOCYTES 4.6 (H) 0.9 - 3.6 K/UL    ABS. MONOCYTES 0.8 0.05 - 1.2 K/UL    ABS. EOSINOPHILS 0.4 0.0 - 0.4 K/UL    ABS.  BASOPHILS 0.0 0.0 - 0.1 K/UL    DF AUTOMATED     METABOLIC PANEL, BASIC    Collection Time: 18 12:25 AM   Result Value Ref Range    Sodium 142 136 - 145 mmol/L    Potassium 3.9 3.5 - 5.5 mmol/L    Chloride 111 (H) 100 - 108 mmol/L    CO2 21 21 - 32 mmol/L    Anion gap 10 3.0 - 18 mmol/L    Glucose 118 (H) 74 - 99 mg/dL    BUN 26 (H) 7.0 - 18 MG/DL    Creatinine 2.28 (H) 0.6 - 1.3 MG/DL    BUN/Creatinine ratio 11 (L) 12 - 20      GFR est AA 25 (L) >60 ml/min/1.73m2    GFR est non-AA 21 (L) >60 ml/min/1.73m2    Calcium 8.4 (L) 8.5 - 10.1 MG/DL   PTT    Collection Time: 05/28/18 12:25 AM   Result Value Ref Range    aPTT 92.6 (H) 23.0 - 36.4 SEC   PROTHROMBIN TIME + INR    Collection Time: 05/28/18 12:25 AM   Result Value Ref Range    Prothrombin time 56.6 (H) 11.5 - 15.2 sec    INR 6.6 (HH) 0.8 - 1.2     GLUCOSE, POC    Collection Time: 05/28/18 12:38 AM   Result Value Ref Range    Glucose (POC) 133 (H) 70 - 110 mg/dL   POC G3    Collection Time: 05/28/18  1:32 AM   Result Value Ref Range    Device: VENT      FIO2 (POC) 100 %    pH (POC) 7.370 7.35 - 7.45      pCO2 (POC) 36.4 35.0 - 45.0 MMHG    pO2 (POC) 385 (H) 80 - 100 MMHG    HCO3 (POC) 21.1 (L) 22 - 26 MMOL/L    sO2 (POC) 100 (H) 92 - 97 %    Base deficit (POC) 4 mmol/L    Mode ASSIST CONTROL      Tidal volume 500 ml    Set Rate 14 bpm    PEEP/CPAP (POC) 5 cmH2O    Allens test (POC) YES      Inspiratory Time 1.25 sec    Total resp.  rate 14      Site RIGHT RADIAL      Specimen type (POC) ARTERIAL      Performed by Wisam Cesar     Volume control plus YES     URINALYSIS W/ RFLX MICROSCOPIC    Collection Time: 05/28/18  2:45 AM   Result Value Ref Range    Color YELLOW      Appearance CLOUDY      Specific gravity 1.014 1.005 - 1.030      pH (UA) 5.5 5.0 - 8.0      Protein TRACE (A) NEG mg/dL    Glucose NEGATIVE  NEG mg/dL    Ketone NEGATIVE  NEG mg/dL    Bilirubin NEGATIVE  NEG      Blood MODERATE (A) NEG      Urobilinogen 1.0 0.2 - 1.0 EU/dL    Nitrites NEGATIVE  NEG      Leukocyte Esterase LARGE (A) NEG     URINE MICROSCOPIC ONLY    Collection Time: 05/28/18  2:45 AM   Result Value Ref Range    WBC TOO NUMEROUS TO COUNT 0 - 4 /hpf    RBC  0 - 5 /hpf     UNABLE TO QUANTITATE MICROSCOPIC PARAMETERS DUE TO EXCESSIVE WBCS    Bacteria 4+ (A) NEG /hpf   EKG, 12 LEAD, INITIAL    Collection Time: 05/28/18  2:56 AM   Result Value Ref Range    Ventricular Rate 85 BPM    Atrial Rate 97 BPM    QRS Duration 92 ms    Q-T Interval 402 ms    QTC Calculation (Bezet) 478 ms    Calculated P Axis 66 degrees    Calculated R Axis 46 degrees    Calculated T Axis 26 degrees    Diagnosis       Undetermined rhythm  Otherwise normal ECG  When compared with ECG of 18-OCT-2014 07:25,  Current undetermined rhythm precludes rhythm comparison, needs review     BLOOD TYPE, (ABO+RH)    Collection Time: 05/28/18  3:50 AM   Result Value Ref Range    ABO/Rh(D) B POSITIVE    TYPE & SCREEN    Collection Time: 05/28/18  3:50 AM   Result Value Ref Range    Crossmatch Expiration 05/31/2018     ABO/Rh(D) B POSITIVE     Antibody screen PENDING     Comment       Specimen processed by automated Blood Bank analyzer   FFP, ALLOCATE    Collection Time: 05/28/18  5:15 AM   Result Value Ref Range    CALLED TO:  KENDAL PERDUE, 2600, AT 0725 ON 05/28/2018 BY [facility]     Unit number [id number]     Blood component type FP 24h, Thaw     Unit division 00     Status of unit ALLOCATED    EKG, 12 LEAD, SUBSEQUENT    Collection Time: 05/28/18  6:41 AM   Result Value Ref Range    Ventricular Rate 95 BPM    Atrial Rate 104 BPM    QRS Duration 88 ms    Q-T Interval 368 ms    QTC Calculation (Bezet) 462 ms    Calculated R Axis 59 degrees    Calculated T Axis 58 degrees    Diagnosis       Atrial fibrillation  Abnormal ECG  When compared with ECG of 28-MAY-2018 02:56,  Previous ECG has undetermined rhythm, needs review  Nonspecific T wave abnormality no longer evident in Inferior leads           Assessment/Plan:     Principal Problem:    ICH (intracerebral hemorrhage) ([facility] [address] (5/28/2018)    Active Problems:    Respiratory failure ([address] (5/28/2018)      UTI (urinary tract infection) (5/28/2018)      Encephalopathy (5/28/2018)          Care Plan   Poor prognosis,pt now DNR.   ICH   - Hemorrhagic stroke 2/2  Eliquis   - Reversal FFP  - CT head noted with large right cerebral intraparenchymal bleed   - keep SBP < 140  - s/p mannitol 100 gr  - continue Mannitol qid   - NO ANTIPLATELETS , NO ANTICOAGULANTS   - keppra for seizure prophylaxis   - Neurology consult in AM   - Intensivist consulted     Encephalopathy   - 2/2 above      Respiratory Failure   - intubated for airway protection  - Intensivist consulted      Supra therapeutic INR   - INR 6.6  - Reversal FFP ordered  - daily INR      UTI  - Rocephin      Hx AFIB  - Eliquis held due to [address]      DVT Prophylaxis - SCD     GI Prophylaxis      DNR

## 2018-05-28 NOTE — ED PROVIDER NOTES
[place] [id number] [facility] ICU      4:02 AM    Date: 5/28/2018  Patient Name: Itz Davila    History of Presenting Illness     Chief Complaint   Patient presents with    Unresponsive       History Provided By: transfer team    Chief Complaint: Brain bleed  Duration:  unknown  Timing:  sudden onset  Location: N/A  Quality: N/A    82 y.o. Female with h/o atrial fibrillation, on coumadin, [facility] s/p being evaluated for sudden onset left temporal HA and left sided facial droop at [facility] and s/p being diagnosed with a hemorrhage. She is intubated upon arrival.     Hx is limited by pt's acuity of condition. No other complaints. Nursing notes regarding the HPI and triage nursing notes were reviewed. Prior medical records were reviewed.      Current Facility-Administered Medications   Medication Dose Route Frequency Provider Last Rate Last Dose    0.9% sodium chloride infusion 250 mL  250 mL IntraVENous PRN Oral Barrera MD        ELECTROLYTE REPLACEMENT PROTOCOL  1 Each Other Clara Curiel MD   Stopped at 05/28/18 1400    ELECTROLYTE REPLACEMENT PROTOCOL  1 Each Other Clara Curiel MD   Stopped at 05/28/18 1400    ELECTROLYTE REPLACEMENT PROTOCOL  1 Each Other Q8H Sharron Marcelino MD   Stopped at 05/28/18 1111 [facility] INFORMATION NOTE  1 Each Other DAILY Sharron Marcelino MD   1 Each at 05/28/18 0700    ondansetron (ZOFRAN) injection 2 mg  2 mg IntraVENous Q6H PRN Mary Hoskins MD        labetalol (NORMODYNE;TRANDATE) 20 mg/4 mL (5 mg/mL) injection 5 mg  5 mg IntraVENous Q15MIN PRN Sharron Marcelino MD        acetaminophen (TYLENOL) tablet 650 mg  650 mg Oral Q4H PRN Mary Hoskins MD        acetaminophen (TYLENOL) suppository 650 mg  650 mg Rectal Q4H PRN Sharron Marcelino MD        senna-docusate (Matt Cruz) 8.6-50 mg per tablet 2 Tab  2 Tab Oral QHS Mary Bañuelos MD        bisacodyl (DULCOLAX) tablet 5 mg  5 mg Oral DAILY PRN Mary Bañuelos MD        bisacodyl (DULCOLAX) suppository 10 mg  10 mg Rectal DAILY PRN Mary Bañuelos MD        pantoprazole (PROTONIX) tablet 40 mg  40 mg Oral Q12H Mary Bañuelos MD        Or    pantoprazole (PROTONIX) granules for oral suspension 40 mg  40 mg Oral Q12H Mary Bañuelos MD        Or    pantoprazole (PROTONIX) 40 mg in sodium chloride 0.9% 10 mL injection  40 mg IntraVENous Q12H Mary Covarrubias MD   40 mg at 05/28/18 1151    albuterol (PROVENTIL VENTOLIN) nebulizer solution 2.5 mg  2.5 mg Nebulization Q4H PRN Mary Bañuelos MD        folic acid (FOLVITE) 1 mg, thiamine (B-1) 100 mg in 0.9% sodium chloride 50 mL ivpb   IntraVENous ACD Mary Bañuelos MD        cefTRIAXone (ROCEPHIN) 1 g in 0.9% sodium chloride (MBP/ADV) 50 mL MBP  1 g IntraVENous Q24H Mary Covarrubias  mL/hr at 05/28/18 1235 1 g at 05/28/18 1235    levETIRAcetam (KEPPRA) 500 mg in saline (iso-osm) 100 ml IVPB  500 mg IntraVENous Q12H Leola Mcburney,  mL/hr at 05/28/18 1200 500 mg at 05/28/18 1200    mannitol (OSMITROL) 20 % infusion 25 g  25 g IntraVENous Q6H Mary Covarrubias  mL/hr at 05/28/18 1701 25 g at 05/28/18 1701    NOREPINephrine (LEVOPHED) 8 mg in 0.9% NS 250ml infusion  2-16 mcg/min IntraVENous CONTINUOUS Ang Thornton MD 26.3 mL/hr at 05/28/18 1816 14 mcg/min at 05/28/18 1816       Past History     Past Medical History:  Past Medical History:   Diagnosis Date    Arthritis     [facility] fibrillation ([facility]     [facility] ([facility]      in sr post cardioversion off multaq due to bradycardia    Back pain     BMI 33.0-33.9,adult 5/22/2017    Cancer ([facility] 1997    bladder    COPD (chronic obstructive pulmonary disease) ([facility]     Depression     Diarrhea     Essential hypertension, benign     STABLE    Glaucoma     Hip pain     Hypertension     IBS (irritable bowel syndrome)     Leg pain     Malignant neoplasm of bladder     Menopause     Mood disorder ([facility] 12/17/97    due to major surgery, mixed depressive anxious features       Past Surgical History:  Past Surgical History:   Procedure Laterality Date    CYSTOSCOPY  11/11/97    excision of a suburethral mass    EGD  12/19/97    HX BREAST BIOPSY Left 1998    benign    HX HYSTERECTOMY      HX OTHER SURGICAL      cervical disc surgery    HX UROLOGICAL  1997    bladder removed; ileostomy    HX UROLOGICAL  1/6/98    Vaginal examination under anesthesia, removal of vaginal packing and irrigation tubing. Injection of radio-opaque contrast and fluoroscopy.  HX UROLOGICAL  12/23/97    Debridement of necrotic portion gracilis flap, vaginal vault including skin and subcutaneous fat, cautery and application of vaginal packing. Family History:  Family History   Problem Relation Age of Onset    Heart Disease Neg Hx        Social History:  Social History   Substance Use Topics    Smoking status: Current Some Day Smoker     Packs/day: 0.25    Smokeless tobacco: Never Used    Alcohol use No       Allergies: Allergies   Allergen Reactions    Dairy-Aid Unknown (comments)     Dairy products      Lyrica [Pregabalin] Other (comments)     lightheaded    Vicodin [Hydrocodone-Acetaminophen] Nausea Only       Patient's primary care provider (as noted in EPIC):  Harriet Bush MD    REVIEW OF SYSTEMS:  Limited secondary to altered mental status. If ROS is provided, it came from collateral sources such as family, friends, or nursing faclity where appropriate. Visit Vitals    /64    Pulse 81    Temp (!) 93 °F (33.9 °C)    Resp 14    Wt 98.6 kg (217 lb 6.4 oz)    SpO2 100%    BMI 36.18 kg/m2       PHYSICAL EXAM:    CONSTITUTIONAL:  Well developed;  well nourished.   Limited secondary to altered mental status. HEAD:  Normocephalic, atraumatic. EYES:  Non-icteric sclera. Normal conjunctiva. Limited secondary to altered mental status. ENTM:  Nose:  no rhinorrhea. Throat:  no erythema or exudate, mucous membranes moist.  NECK:  No JVD. Limited secondary to altered mental status. RESPIRATORY:  Chest clear, equal breath sounds, good air movement. CARDIOVASCULAR:  Regular rate and rhythm. No murmurs, rubs, or gallops. GI:  Normal bowel sounds, abdomen soft. Limited secondary to altered mental status. BACK:  Non-tender. UPPER EXT:  Normal inspection. LOWER EXT:  No edema, no calf tenderness. Distal pulses intact. NEURO:  Limited secondary to altered mental status. SKIN:  No rashes;  Normal for age. PSYCH:  Limited secondary to altered mental status. DIFFERENTIAL DIAGNOSES/ MEDICAL DECISION MAKING:  Hypoglycemia, acute alcohol, drug, or multipharmacy intoxication, sepsis from numerous possible sources including urosepsis, pneumonia, meningitis, significant CVA, TIA, intracerebral hemorrhage, subdural hemorrhage, seizure, significant trauma, electrolyte or hormonal imbalance, other etiologies, versus a combination of the above.     Diagnostic Study Results     Abnormal lab results from this emergency department encounter:  Labs Reviewed   CBC W/O DIFF - Abnormal; Notable for the following:        Result Value    RBC 2.87 (*)     HGB 9.2 (*)     HCT 27.1 (*)     RDW 17.3 (*)     All other components within normal limits   PROTHROMBIN TIME + INR - Abnormal; Notable for the following:     Prothrombin time 23.0 (*)     INR 2.1 (*)     All other components within normal limits   PTT - Abnormal; Notable for the following:     aPTT 44.3 (*)     All other components within normal limits   METABOLIC PANEL, BASIC - Abnormal; Notable for the following:     CO2 19 (*)     Glucose 166 (*)     BUN 27 (*)     Creatinine 2.66 (*)     BUN/Creatinine ratio 10 (*)     GFR est AA 21 (*)     GFR est non-AA 17 (*) Calcium 8.4 (*)     All other components within normal limits   CALCIUM, IONIZED - Abnormal; Notable for the following:     Ionized Calcium 0.95 (*)     All other components within normal limits   HEPATIC FUNCTION PANEL - Abnormal; Notable for the following:      Albumin 2.9 (*)     All other components within normal limits   AMMONIA - Abnormal; Notable for the following:     Ammonia 43 (*)     All other components within normal limits   SED RATE (ESR) - Abnormal; Notable for the following:     Sed rate, automated >140 (*)     All other components within normal limits   LIPID PANEL - Abnormal; Notable for the following:     Triglyceride 198 (*)     All other components within normal limits   GLUCOSE, POC - Abnormal; Notable for the following:     Glucose (POC) 165 (*)     All other components within normal limits   MAGNESIUM   PHOSPHORUS   LIPASE   CARDIAC PANEL,(CK, CKMB & TROPONIN)   CRP, HIGH SENSITIVITY   HEMOGLOBIN A1C WITH EAG   CORTISOL   DRUG SCREEN, URINE   OCCULT BLOOD, STOOL   PROTHROMBIN TIME + INR   PROTHROMBIN TIME + INR   FFP, ALLOCATE   BLOOD TYPE, (ABO+RH)   TYPE & SCREEN       Lab values for this patient within approximately the last 12 hours:  Recent Results (from the past 12 hour(s))   CBC W/O DIFF    Collection Time: 05/28/18 10:50 AM   Result Value Ref Range    WBC 9.5 4.6 - 13.2 K/uL    RBC 2.87 (L) 4.20 - 5.30 M/uL    HGB 9.2 (L) 12.0 - 16.0 g/dL    HCT 27.1 (L) 35.0 - 45.0 %    MCV 94.4 74.0 - 97.0 FL    MCH 32.1 24.0 - 34.0 PG    MCHC 33.9 31.0 - 37.0 g/dL    RDW 17.3 (H) 11.6 - 14.5 %    PLATELET 677 617 - 890 K/uL    MPV 10.3 9.2 - 11.8 FL   PROTHROMBIN TIME + INR    Collection Time: 05/28/18 10:50 AM   Result Value Ref Range    Prothrombin time 23.0 (H) 11.5 - 15.2 sec    INR 2.1 (H) 0.8 - 1.2     PTT    Collection Time: 05/28/18 10:50 AM   Result Value Ref Range    aPTT 44.3 (H) 23.0 - 96.3 SEC   METABOLIC PANEL, BASIC    Collection Time: 05/28/18 10:50 AM   Result Value Ref Range Sodium 140 136 - 145 mmol/L    Potassium 4.3 3.5 - 5.5 mmol/L    Chloride 107 100 - 108 mmol/L    CO2 19 (L) 21 - 32 mmol/L    Anion gap 14 3.0 - 18 mmol/L    Glucose 166 (H) 74 - 99 mg/dL    BUN 27 (H) 7.0 - 18 MG/DL    Creatinine 2.66 (H) 0.6 - 1.3 MG/DL    BUN/Creatinine ratio 10 (L) 12 - 20      GFR est AA 21 (L) >60 ml/min/1.73m2    GFR est non-AA 17 (L) >60 ml/min/1.73m2    Calcium 8.4 (L) 8.5 - 10.1 MG/DL   CALCIUM, IONIZED    Collection Time: 05/28/18 10:50 AM   Result Value Ref Range    Ionized Calcium 0.95 (L) 1.12 - 1.32 MMOL/L   MAGNESIUM    Collection Time: 05/28/18 10:50 AM   Result Value Ref Range    Magnesium 2.0 1.6 - 2.6 mg/dL   PHOSPHORUS    Collection Time: 05/28/18 10:50 AM   Result Value Ref Range    Phosphorus 3.2 2.5 - 4.9 MG/DL   HEPATIC FUNCTION PANEL    Collection Time: 05/28/18 10:50 AM   Result Value Ref Range    Protein, total 6.7 6.4 - 8.2 g/dL    Albumin 2.9 (L) 3.4 - 5.0 g/dL    Globulin 3.8 2.0 - 4.0 g/dL    A-G Ratio 0.8 0.8 - 1.7      Bilirubin, total 0.4 0.2 - 1.0 MG/DL    Bilirubin, direct 0.2 0.0 - 0.2 MG/DL    Alk.  phosphatase 80 45 - 117 U/L    AST (SGOT) 23 15 - 37 U/L    ALT (SGPT) 17 13 - 56 U/L   LIPASE    Collection Time: 05/28/18 10:50 AM   Result Value Ref Range    Lipase 212 73 - 393 U/L   AMMONIA    Collection Time: 05/28/18 10:50 AM   Result Value Ref Range    Ammonia 43 (H) 11 - 32 UMOL/L   CARDIAC PANEL,(CK, CKMB & TROPONIN)    Collection Time: 05/28/18 10:50 AM   Result Value Ref Range     26 - 192 U/L    CK - MB 1.0 <3.6 ng/ml    CK-MB Index 0.9 0.0 - 4.0 %    Troponin-I, Qt. 0.02 0.0 - 0.045 NG/ML   SED RATE (ESR)    Collection Time: 05/28/18 10:50 AM   Result Value Ref Range    Sed rate, automated >140 (H) 0 - 30 mm/hr   LIPID PANEL    Collection Time: 05/28/18 10:50 AM   Result Value Ref Range    LIPID PROFILE          Cholesterol, total 142 <200 MG/DL    Triglyceride 198 (H) <150 MG/DL    HDL Cholesterol 47 40 - 60 MG/DL    LDL, calculated 55.4 0 - 100 MG/DL    VLDL, calculated 39.6 MG/DL    CHOL/HDL Ratio 3.0 0 - 5.0     HEMOGLOBIN A1C WITH EAG    Collection Time: 05/28/18 10:50 AM   Result Value Ref Range    Hemoglobin A1c 5.6 4.2 - 5.6 %    Est. average glucose 114 mg/dL   CORTISOL    Collection Time: 05/28/18 10:50 AM   Result Value Ref Range    Cortisol, random 8.3 3.09 - 22.40 ug/dL   DRUG SCREEN, URINE    Collection Time: 05/28/18  3:12 PM   Result Value Ref Range    BENZODIAZEPINES NEGATIVE  NEG      BARBITURATES NEGATIVE  NEG      THC (TH-CANNABINOL) NEGATIVE  NEG      OPIATES NEGATIVE  NEG      PCP(PHENCYCLIDINE) NEGATIVE  NEG      COCAINE NEGATIVE  NEG      AMPHETAMINES NEGATIVE  NEG      METHADONE NEGATIVE  NEG      HDSCOM (NOTE)    EKG, 12 LEAD, SUBSEQUENT    Collection Time: 05/28/18  3:16 PM   Result Value Ref Range    Ventricular Rate 79 BPM    Atrial Rate 122 BPM    QRS Duration 90 ms    Q-T Interval 424 ms    QTC Calculation (Bezet) 486 ms    Calculated R Axis 51 degrees    Calculated T Axis 46 degrees    Diagnosis       Atrial fibrillation  Nonspecific T wave abnormality , probably digitalis effect  Prolonged QT  Abnormal ECG  When compared with ECG of 28-MAY-2018 06:41,  No significant change was found  Confirmed by Juan Carlos Zapata MD, Brenden Cali ([id number]) on 5/28/2018 4:50:36 PM     GLUCOSE, POC    Collection Time: 05/28/18  6:38 PM   Result Value Ref Range    Glucose (POC) 165 (H) 70 - 110 mg/dL       Radiologist and cardiologist interpretations if available at time of this note:  Ct Head Wo Cont    Result Date: 5/28/2018  EXAM: CT head INDICATION: Intracranial hemorrhage COMPARISON: Noncontrast CT brain done earlier today TECHNIQUE: Axial CT imaging of the head was performed without intravenous contrast. Coronal and sagittal reconstructions were performed. One or more dose reduction techniques were used on this CT: automated exposure control, adjustment of the mAs and/or kVp according to patient's size, and iterative reconstruction techniques.  The specific techniques utilized on this CT exam have been documented in the patient's electronic medical record. _______________ FINDINGS: BRAIN AND POSTERIOR FOSSA: The large acute right cerebral hemorrhage involving the right temporal and parietal lobes is again noted. There are areas of mixed attenuation within this. Accurate measurement is difficult because of the irregular shape. It does not appear significantly enlarged measuring 9 x 5.8 x 5.0 cm compared to 9.2 x 5.5 x 4.7 cm on earlier study. The amount of vasogenic edema may be increased. There is increased right to left shift now measuring up to 15 mm compared to 12 mm on earlier exam. Right frontal and parietal subdural hematoma is more prominent. There continues be a large amount of intraventricular blood. The temporal horn of the left lateral ventricle is significantly enlarged compared to earlier study with possible periventricular hypodensity which may represent transependymal resorption of CSF. Blood extends into the third and fourth ventricles. New loss of gray-white matter differentiation in the right occipital and posterior parietal lobes consistent with acute or evolving infarct in this location. Probable secretions in the nasopharynx. _______________     IMPRESSION: 1. No definite increase size in large complex acute intracerebral hematoma involving the right temporal and parietal lobes. Moderate mass effect however is increased probably related to vasogenic edema and possible new or at least obvious infarct involving the posterior parietal and occipital lobes. Increasing left to right shift with trapped left temporal horn which is significantly enlarged compared to earlier exam with possible transependymal resorption of CSF across the temporal horn. 2. Small subdural hematoma involving the right frontal and parietal regions.  This is probably present on prior study but is more apparent on this exam. As the attending radiologist I have personally reviewed the study and preliminary report, and concur with the preliminary findings. Ct Head Wo Cont    Result Date: 5/28/2018  CT of the head without contrast HISTORY: CODE S, history of atrial fibrillation presenting with left-sided weakness and severe headache. Patient found to have left-sided facial droop and slurred speech. COMPARISON: CT 10/16/2014 All CT scans at this facility are performed using dose optimization technique as appropriate to a performed exam, to include automated exposure control, adjustment of the MA and/or kV according to patient size (including appropriate matching for site-specific examinations) or use of  iterative reconstruction technique. FINDINGS: Large right cerebral intraparenchymal hemorrhage, multilobulated and heterogeneous with mixed areas of low and high attenuation measuring roughly 9.2 x 5.5 x 4.7 cm for an estimated volume of 119 cc. Moderate volume of intraventricular hemorrhage in the right greater than left lateral ventricles, filling the third and fourth ventricles extending to the dorsal foramen magnum. 1.2 cm leftward midline shift of the septum pellucidum. Partial effacement of suprasellar cistern from uncal deviation. Entrapped right temporal horn of the lateral ventricle. No hydrocephalus. Left frontal sinus osteoma. Other paranasal sinuses and mastoid air cells are clear. No acute osseous abnormality. Impression: Large right cerebral hemorrhage most likely a hemorrhagic stroke with intraventricular hemorrhage, subfalcine and uncal herniation and entrapped right temporal horn. Findings called to Dr. Austin Howell at 12:54 AM 5/28/2018. Xr Chest Port    Result Date: 5/28/2018  EXAM: Chest portable INDICATION: Respiratory failure. Intubated COMPARISON: 5/28/2018 _______________ FINDINGS: AP portable chest film was performed. Endotracheal tube and NG tube are present in good position. Poor inspiration.  Persistent right lower lobe consolidation which could represent atelectasis or infiltrate. Chest is otherwise stable. _______________     IMPRESSION: 1. Lines and tubes in good position. 2. Poor inspiration with persistent right lower lobe consolidation which appears slightly improved. Xr Chest Port    Result Date: 5/28/2018  CHEST PORTABLE INDICATIONS: Altered mental status, headache and slurred speech. On warfarin for A. fib. COMPARISON: 10/16/2014 FINDINGS: Support lines/catheters: Tip of the endotracheal tube projects 3.5 cm above the munir. Enteric tube courses below the diaphragm however is incompletely visualized. Lungs: Small lung volumes with streaky right basilar opacities. Cardiac silhouette and mediastinal contours: Normal. Pleural spaces: No pneumothorax or pleural effusion. Bones and soft tissues: Lower cervical fusion hardware. Impression: Hypoinflated with right basilar atelectasis. Endotracheal tube is in good position. Incompletely visualized enteric tube.        Medication(s) ordered for patient during this emergency visit encounter:  Medications   0.9% sodium chloride infusion 250 mL (not administered)   ELECTROLYTE REPLACEMENT PROTOCOL (0 Each Other Held 5/28/18 1400)   ELECTROLYTE REPLACEMENT PROTOCOL (0 Each Other Held 5/28/18 1400)   ELECTROLYTE REPLACEMENT PROTOCOL (0 Each Other Held 5/28/18 1400)   PHARMACY INFORMATION NOTE (1 Each Other Given 5/28/18 0700)   ondansetron (ZOFRAN) injection 2 mg (not administered)   labetalol (NORMODYNE;TRANDATE) 20 mg/4 mL (5 mg/mL) injection 5 mg (not administered)   acetaminophen (TYLENOL) tablet 650 mg (not administered)   acetaminophen (TYLENOL) suppository 650 mg (not administered)   senna-docusate (PERICOLACE) 8.6-50 mg per tablet 2 Tab (not administered)   bisacodyl (DULCOLAX) tablet 5 mg (not administered)   bisacodyl (DULCOLAX) suppository 10 mg (not administered)   pantoprazole (PROTONIX) tablet 40 mg ( Oral See Alternative 5/28/18 1151)     Or   pantoprazole (PROTONIX) granules for oral suspension 40 mg ( Oral See Alternative 5/28/18 1151)     Or   pantoprazole (PROTONIX) 40 mg in sodium chloride 0.9% 10 mL injection (40 mg IntraVENous Given 5/28/18 1151)   albuterol (PROVENTIL VENTOLIN) nebulizer solution 2.5 mg (not administered)   folic acid (FOLVITE) 1 mg, thiamine (B-1) 100 mg in 0.9% sodium chloride 50 mL ivpb ( IntraVENous Given 5/28/18 1540)   cefTRIAXone (ROCEPHIN) 1 g in 0.9% sodium chloride (MBP/ADV) 50 mL MBP (1 g IntraVENous New Bag 5/28/18 1235)   levETIRAcetam (KEPPRA) 500 mg in saline (iso-osm) 100 ml IVPB (500 mg IntraVENous New Bag 5/28/18 1200)   mannitol (OSMITROL) 20 % infusion 25 g (25 g IntraVENous New Bag 5/28/18 1701)   NOREPINephrine (LEVOPHED) 8 mg in 0.9% NS 250ml infusion (14 mcg/min IntraVENous Rate Change 5/28/18 1816)   0.9% sodium chloride infusion 500 mL (0 mL IntraVENous IV Completed 5/28/18 0900)       Medical Decision Making     I am the first provider for this patient. I reviewed the vital signs, available nursing notes, past medical history, past surgical history, family history and social history. Vital Signs:  Reviewed the patient's vital signs. ED COURSE:    Critical Care Note:  Altered Mental Status    Critical care minutes: 30 MINUTES. Given patients presentation to ed with noted change in mental status, numerous serial neurological examinations were performed, as well as evaluation of vital signs. Given the patients underlying condition medical intervention(s) were needed, requiring numerous reevaluations of patient's vital signs and response to different emergency department therapies, total bedside time evaluating and/or treating the patient, not including procedures, is noted below. Admit to Hospitalist    The patient was presented to the accepting hospitalist, Dr. Gosia Victor.     The patient's primary doctor is Evaristo Nicholas MD, and admissions for this physician are with the hospitalist.  If the patient has no primary doctor, then admission is to the hospitalist as well. As the emergency physician, I wrote courtesy admission orders for the hospitalist physician. The courtesy orders included explicit instructions for the floor nursing staff to call the admitting attending physician upon patient arrival on the floor. Coding Diagnoses     Clinical Impression:   1. Intracranial hemorrhage (HCC)        Disposition     Disposition:  Admit on transfer from [facility]. CAROLYNE Chowdhury Board Certified Emergency Physician    Provider Attestation:  If a scribe was utilized in generation of this patient record, I personally performed the services described in the documentation, reviewed the documentation, as recorded by the scribe in my presence, and it accurately records the patient's history of presenting illness, review of systems, patient physical examination, and procedures performed by me as the attending physician. CAROLYNE Chowdhury Board Certified Emergency Physician  5/28/2018.  4:02 AM    Scribe Attestation     Nargis Lilian acting as a scribe for and in the presence of Jo Rivers MD      May 28, 2018 at 4:10 AM       Provider Attestation:      I personally performed the services described in the documentation, reviewed the documentation, as recorded by the scribe in my presence, and it accurately and completely records my words and actions.  May 28, 2018 at 4:10 AM - Jo Rivers MD

## 2018-05-28 NOTE — PROGRESS NOTES
Problem: Discharge Planning  Goal: *Discharge to safe environment  Outcome: Progressing Towards Goal  Discharge plan is pending.

## 2018-05-28 NOTE — H&P
History and Physical    Patient: Early Leventhal               Sex: female          DOA: 5/28/2018       YOB: 1935      Age:  82 y.o.        LOS:  LOS: 0 days        Chief Complaint   Patient presents with   Lafrances Sophia         HPI:     Early Leventhal is a 82 y.o. female who presents as a transfer from [facility] view intubated with hemorrhagic stroke. Patient was on Eliquis for AFIB . CT head shows large rt intraparenchymal cerebral hemorrhage inv ventricles. Tele neurology reports non surgical mgt. Prior to arrival in ED was intubated and given mannitol 100 gr and Keppra. Past Medical History:   Diagnosis Date    Arthritis     Atrial fibrillation ([address]     Atrial fibrillation (HCC)      in sr post cardioversion off multaq due to bradycardia    Back pain     BMI 33.0-33.9,adult 5/22/2017    Cancer ([facility] 1997    bladder    COPD (chronic obstructive pulmonary disease) (HCC)     Depression     Diarrhea     Essential hypertension, benign     STABLE    Glaucoma     Hip pain     Hypertension     IBS (irritable bowel syndrome)     Leg pain     Malignant neoplasm of bladder     Menopause     Mood disorder ([address] 12/17/97    due to major surgery, mixed depressive anxious features   . Past Surgical History:   Procedure Laterality Date    CYSTOSCOPY  11/11/97    excision of a suburethral mass    EGD  12/19/97    HX BREAST BIOPSY Left 1998    benign    HX HYSTERECTOMY      HX OTHER SURGICAL      cervical disc surgery    HX UROLOGICAL  1997    bladder removed; ileostomy    HX UROLOGICAL  1/6/98    Vaginal examination under anesthesia, removal of vaginal packing and irrigation tubing. Injection of radio-opaque contrast and fluoroscopy.  HX UROLOGICAL  12/23/97    Debridement of necrotic portion gracilis flap, vaginal vault including skin and subcutaneous fat, cautery and application of vaginal packing.        Current Facility-Administered Medications on File Prior to Encounter Medication Dose Route Frequency Provider Last Rate Last Dose    [COMPLETED] mannitol (OSMITROL) 20 % infusion 100 g  100 g IntraVENous NOW Radha Mcdermott MD   Stopped at 05/28/18 0300    [COMPLETED] succinylcholine (ANECTINE) injection 100 mg  100 mg IntraVENous NOW Radha Mcdermott MD   100 mg at 05/28/18 0101    [COMPLETED] etomidate (AMIDATE) 2 mg/mL injection 20 mg  20 mg IntraVENous NOW Radha Mcdermott MD   20 mg at 05/28/18 0100    [COMPLETED] levETIRAcetam (KEPPRA) 2,000 mg in 0.9% sodium chloride 250 mL IVPB  2,000 mg IntraVENous NOW Radha Mcdermott MD   2,000 mg at 05/28/18 0110     Current Outpatient Prescriptions on File Prior to Encounter   Medication Sig Dispense Refill    apixaban (ELIQUIS) 5 mg tablet Take 5 mg by mouth two (2) times a day.  warfarin (COUMADIN) 5 mg tablet Take 1 Tab by mouth daily. 60 Tab 3    allopurinol (ZYLOPRIM) 100 mg tablet Take  by mouth daily.  valsartan-hydrochlorothiazide (DIOVAN-HCT) 80-12.5 mg per tablet Take 1 Tab by mouth daily.  gabapentin (NEURONTIN) 300 mg capsule Take 300 mg by mouth.  aspirin 81 mg chewable tablet Take 1 Tab by mouth daily. 27 Tab 3       Social History     Social History    Marital status:      Spouse name: N/A    Number of children: N/A    Years of education: N/A     Occupational History    Not on file. Social History Main Topics    Smoking status: Current Some Day Smoker     Packs/day: 0.25    Smokeless tobacco: Never Used    Alcohol use No    Drug use: No    Sexual activity: Not on file     Other Topics Concern    Not on file     Social History Narrative       Prior to Admission Medications   Prescriptions Last Dose Informant Patient Reported? Taking?   allopurinol (ZYLOPRIM) 100 mg tablet   Yes No   Sig: Take  by mouth daily. apixaban (ELIQUIS) 5 mg tablet   Yes No   Sig: Take 5 mg by mouth two (2) times a day. aspirin 81 mg chewable tablet   No No   Sig: Take 1 Tab by mouth daily. gabapentin (NEURONTIN) 300 mg capsule   Yes No   Sig: Take 300 mg by mouth.   valsartan-hydrochlorothiazide (DIOVAN-HCT) 80-12.5 mg per tablet   Yes No   Sig: Take 1 Tab by mouth daily. warfarin (COUMADIN) 5 mg tablet   No No   Sig: Take 1 Tab by mouth daily. Facility-Administered Medications: None       Family History   Problem Relation Age of Onset    Heart Disease Neg Hx        Allergies   Allergen Reactions    Dairy-Aid Unknown (comments)     Dairy products      Lyrica [Pregabalin] Other (comments)     lightheaded    Vicodin [Hydrocodone-Acetaminophen] Nausea Only       Review of Systems   Unable to perform ROS: Patient unresponsive       Physical Exam:       Visit Vitals    /73    Pulse 92    Resp 14    SpO2 100%       Physical Exam   Constitutional: She is intubated. Eyes: Conjunctivae are normal. No scleral icterus. Neck: Neck supple. Cardiovascular: Normal rate, regular rhythm and normal heart sounds. Pulmonary/Chest: She is intubated. No respiratory distress. She has no wheezes. She has no rales. Abdominal: Soft. Bowel sounds are normal.   Musculoskeletal: She exhibits no edema. Neurological: She is unresponsive. Skin: Skin is warm. No rash noted. No erythema. No pallor. Ancillary Studies: All lab and imaging reviewed for the past 24 hours. Recent Results (from the past 24 hour(s))   CBC WITH AUTOMATED DIFF    Collection Time: 05/28/18 12:25 AM   Result Value Ref Range    WBC 8.4 4.6 - 13.2 K/uL    RBC 3.67 (L) 4.20 - 5.30 M/uL    HGB 11.6 (L) 12.0 - 16.0 g/dL    HCT 33.5 (L) 35.0 - 45.0 %    MCV 91.3 74.0 - 97.0 FL    MCH 31.6 24.0 - 34.0 PG    MCHC 34.6 31.0 - 37.0 g/dL    RDW 16.6 (H) 11.6 - 14.5 %    PLATELET 592 126 - 224 K/uL    MPV 10.0 9.2 - 11.8 FL    NEUTROPHILS 32 (L) 40 - 73 %    LYMPHOCYTES 55 (H) 21 - 52 %    MONOCYTES 9 3 - 10 %    EOSINOPHILS 4 0 - 5 %    BASOPHILS 0 0 - 2 %    ABS. NEUTROPHILS 2.7 1.8 - 8.0 K/UL    ABS.  LYMPHOCYTES 4.6 (H) 0.9 - 3.6 K/UL    ABS. MONOCYTES 0.8 0.05 - 1.2 K/UL    ABS. EOSINOPHILS 0.4 0.0 - 0.4 K/UL    ABS. BASOPHILS 0.0 0.0 - 0.1 K/UL    DF AUTOMATED     METABOLIC PANEL, BASIC    Collection Time: 05/28/18 12:25 AM   Result Value Ref Range    Sodium 142 136 - 145 mmol/L    Potassium 3.9 3.5 - 5.5 mmol/L    Chloride 111 (H) 100 - 108 mmol/L    CO2 21 21 - 32 mmol/L    Anion gap 10 3.0 - 18 mmol/L    Glucose 118 (H) 74 - 99 mg/dL    BUN 26 (H) 7.0 - 18 MG/DL    Creatinine 2.28 (H) 0.6 - 1.3 MG/DL    BUN/Creatinine ratio 11 (L) 12 - 20      GFR est AA 25 (L) >60 ml/min/1.73m2    GFR est non-AA 21 (L) >60 ml/min/1.73m2    Calcium 8.4 (L) 8.5 - 10.1 MG/DL   PTT    Collection Time: 05/28/18 12:25 AM   Result Value Ref Range    aPTT 92.6 (H) 23.0 - 36.4 SEC   PROTHROMBIN TIME + INR    Collection Time: 05/28/18 12:25 AM   Result Value Ref Range    Prothrombin time 56.6 (H) 11.5 - 15.2 sec    INR 6.6 (HH) 0.8 - 1.2     GLUCOSE, POC    Collection Time: 05/28/18 12:38 AM   Result Value Ref Range    Glucose (POC) 133 (H) 70 - 110 mg/dL   POC G3    Collection Time: 05/28/18  1:32 AM   Result Value Ref Range    Device: VENT      FIO2 (POC) 100 %    pH (POC) 7.370 7.35 - 7.45      pCO2 (POC) 36.4 35.0 - 45.0 MMHG    pO2 (POC) 385 (H) 80 - 100 MMHG    HCO3 (POC) 21.1 (L) 22 - 26 MMOL/L    sO2 (POC) 100 (H) 92 - 97 %    Base deficit (POC) 4 mmol/L    Mode ASSIST CONTROL      Tidal volume 500 ml    Set Rate 14 bpm    PEEP/CPAP (POC) 5 cmH2O    Allens test (POC) YES      Inspiratory Time 1.25 sec    Total resp.  rate 14      Site RIGHT RADIAL      Specimen type (POC) ARTERIAL      Performed by Oralee Cough     Volume control plus YES     URINALYSIS W/ RFLX MICROSCOPIC    Collection Time: 05/28/18  2:45 AM   Result Value Ref Range    Color YELLOW      Appearance CLOUDY      Specific gravity 1.014 1.005 - 1.030      pH (UA) 5.5 5.0 - 8.0      Protein TRACE (A) NEG mg/dL    Glucose NEGATIVE  NEG mg/dL    Ketone NEGATIVE  NEG mg/dL    Bilirubin NEGATIVE  NEG      Blood MODERATE (A) NEG      Urobilinogen 1.0 0.2 - 1.0 EU/dL    Nitrites NEGATIVE  NEG      Leukocyte Esterase LARGE (A) NEG     URINE MICROSCOPIC ONLY    Collection Time: 05/28/18  2:45 AM   Result Value Ref Range    WBC TOO NUMEROUS TO COUNT 0 - 4 /hpf    RBC  0 - 5 /hpf     UNABLE TO QUANTITATE MICROSCOPIC PARAMETERS DUE TO EXCESSIVE WBCS    Bacteria 4+ (A) NEG /hpf   EKG, 12 LEAD, INITIAL    Collection Time: 05/28/18  2:56 AM   Result Value Ref Range    Ventricular Rate 85 BPM    Atrial Rate 97 BPM    QRS Duration 92 ms    Q-T Interval 402 ms    QTC Calculation (Bezet) 478 ms    Calculated P Axis 66 degrees    Calculated R Axis 46 degrees    Calculated T Axis 26 degrees    Diagnosis       Undetermined rhythm  Otherwise normal ECG  When compared with ECG of 18-OCT-2014 07:25,  Current undetermined rhythm precludes rhythm comparison, needs review         Assessment/Plan     Active Problems:    ICH (intracerebral hemorrhage) (HCC) (5/28/2018)      Respiratory failure ([address] (5/28/2018)      UTI (urinary tract infection) (5/28/2018)      Encephalopathy (5/28/2018)        PLAN:    ICH   - Hemorrhagic stroke 2/2  Eliquis   - Reversal FFP  - CT head noted with large right cerebral intraparenchymal bleed   - keep SBP < 140  - s/p mannitol 100 gr  - continue Mannitol qid   - NO ANTIPLATELETS , NO ANTICOAGULANTS   - keppra for seizure prophylaxis   - Neurology consult in AM   - Intensivist consulted    Encephalopathy   - 2/2 above     Respiratory Failure   - intubated for airway protection  - Intensivist consulted     Supra therapeutic INR   - INR 6.6  - Reversal FFP ordered  - daily INR     UTI  - Rocephin     Hx AFIB  - Eliquis held due to [address]     DVT Prophylaxis - SCD    GI Prophylaxis     Full code     Mary Toscano MD  5/28/2018  4:37 AM

## 2018-05-28 NOTE — PROGRESS NOTES
Speech Therapy Note:    SLP acknowledging eval & tx orders; however, after d/w interdisciplinary team and EMR review, pt non-responsive and on vent. Will d/c orders accordingly. SLP available for evaluation once appropriate and indicated by MD. Fidencio Rojas.  [address]anda [address] [id number] [facility]  Office: [phone]  Pager: [phone]

## 2018-05-28 NOTE — ED NOTES
EKG  Time: 02:56  Rate: 85  Rhythm: Undetermined rhythm, otherwise normal EKG. Scribe [id number] [facility] acting as a scribe for and in the presence of Radha Mcdermott MD      May 28, 2018 at 5:48 AM       Provider Attestation:      I personally performed the services described in the documentation, reviewed the documentation, as recorded by the scribe in my presence, and it accurately and completely records my words and actions.  May 28, 2018 at 5:48 AM - Radha Mcdermott MD

## 2018-05-28 NOTE — PROGRESS NOTES
Assumed care from Sherwin, [address]. Patient is unresponsive. No cough and gag, no movement to any stimulus to all extremities, pupils are fixed. Intubated, no sedation required. On levophed for blood pressure support. JULES Courtney updated daughter who is in [place].   0000> Patient remains unresponsive, no movement to all extremities. Pupils are still fixed. 0515> Patient's blood pressure is drastically going down despite being maxed with levophed. Called [facility], patient's son and POA to give update on patient's condition. Tito Saravia verbalized \"Ok, alright. \"  0720> Bedside and Verbal shift change report given to Edgard Duke RN, Joel Cloby RN (oncoming nurse) by Gabe Gary RN (offgoing nurse). Report included the following information SBAR, Kardex, Intake/Output, MAR and Cardiac Rhythm AFIB.

## 2018-05-28 NOTE — PROGRESS NOTES
attempted to conduct an initial consultation and spiritual assessment for Silvio President, who is a 82 y. o.female. However, patient was unavailable, on ventilator support. Patients primary language is: [place]. According to the patients EMR, her [employer] affiliation is: [facility].     The  provided the following interventions:  Offered silent prayer on patient's behalf. Reviewed chart. Plan:  Chaplains will continue our attempts to connect with patient and then provide pastoral care on an as-needed/requested basis.     [facility] Certified [address] [place] [address],[address] [place] [address]    [phone]

## 2018-05-28 NOTE — ED NOTES
Pt arrived to the ED by EMS with co DIYA. Pt started co of HA today at 2350. Pt started having slurred speech after. Upon medic arrival, pt was communication co HA and had slurred speech that worsened.  ml/dl /110. Pt on warfarin for AFIB. Pt also taking ASA.  Pt vomited upon CT arrival. Pt currently unresponsive to pain

## 2018-05-28 NOTE — ED NOTES
EMTALA form completed and signed by Tito Erazo RN, Saray Kuo RN, [facility] Transportation, and Marybel Devi MD. Hard copy sent with pt and transportation. Copy of EMTALA given to  to be scanned into pt EMR.

## 2018-05-28 NOTE — Clinical Note
Status[de-identified] Inpatient [101] Type of Bed: Intensive Care [6] Inpatient Hospitalization Certified Necessary for the Following Reasons: 3. Patient receiving treatment that can only be provided in an inpatient setting (further clarification in H&P documentation) Admitting Diagnosis: ICH (intracerebral hemorrhage) ([facility] [[id number]] Admitting Physician: Herve Marino Attending Physician: Herve Marino Estimated Length of Stay: 2 Midnights Discharge Plan[de-identified] Home with Office Follow-up

## 2018-05-28 NOTE — ED NOTES
TRANSFER - OUT REPORT:    Verbal report given to Sol Kulkarni RN(name) on Morgan Cade  being transferred to [place] [address] ER(unit) for routine progression of care     Report consisted of patients Situation, Background, Assessment and Recommendations(SBAR). Information from the following report(s) SBAR, ED Summary, Procedure Summary, MAR, Recent Results and Cardiac Rhythm NSR was reviewed with the receiving nurse. Opportunity for questions and clarification was provided. Patient transported with medical transport ALS.

## 2018-05-28 NOTE — PROGRESS NOTES
OT order received, chart reviewed. Per discussion with Jean Cole RN, pt is intubated and unresponsive to stimuli. Not appropriate for skilled therapy at this time. Will discontinue OT orders.     Jeanenne Skiff, MS OTR/L  Office Ext: [phone]  Pager: [phone]

## 2018-05-28 NOTE — PROGRESS NOTES
ARU/IPR REFERRAL CONTACT NOTE  [id number] [place] [address] for Physical Rehabilitation    RE: Sourav Murcia     Thank you for the opportunity to review this patient's case for admission to [id number] [place] [address] for Physical Rehabilitation. Based on our pre-admission screening:     [x] Our Team/Medical Director is following this case. Comments: Thank you for the referral on Ms Shanon Sparks. Our admissions team will follow her care for potential admission to ARU. [ ] This patient meets criteria for admission to [facility] for Physical      Rehabilitation. [ ] This patient does not meet criteria for admission to [facility] for Physical        Rehabilitation due to:    [ ] Documents do not reflect active medical necessity requiring close Physician involvement and Rehabilitation Nursing. [ ] Too functional, per documentation, patient does not require both Physical and Occupational Therapy Services at an acute rehabilitation level of intensity. [ ] Too low level, per documentation, patient has not demonstrated tolerance for acute rehabilitation level of intensity. [ ] Other:    [ ] We recommend the following:  [ ] Re-evaluation of this patient's status when appropriate  [ ] Home with [facility].  [ ] Outpatient Therapy Services  [ ] Skilled Nursing Facility/Sub Acute Services with extended stay option  [ ] Assisted Living/ Adult Home    Again, Thank you for this referral. Should you have any questions please do not hesitate to call.      Sincerely,  Sylvester Zarate, LATANYA  [facility] Physical Rehabilitation  [phone]

## 2018-05-29 NOTE — PROGRESS NOTES
-0820-Received patient on ventilator. ACVC+ rate-14, VT-500, PEEP-5, FIO2-35%. O2 Sats-100% HR-118, RR-14. #7.5 ETT noted to be patent and secure. Respiratory will continue to monitor. -[facility]    --0830-SBT attempted  Per protocol  PS-7, PEEP-5without success due to apnea episodes. Pt. Returned to above settings. Respiratory will continue to monitor. -[facility]    -1400-ABG drawn and reported PAYAL Shin PA. Results below. RR-decreased to 12bpm. VT-decreased to 400ml. [facility]    Results for Aye Tavares (MRN [id number]) as of 5/29/2018 14:02   Ref. Range 5/29/2018 13:47   pH (POC) Latest Ref Range: 7.35 - 7.45   7.385   pCO2 (POC) Latest Ref Range: 35.0 - 45.0 MMHG 28.2 (L)   pO2 (POC) Latest Ref Range: 80 - 100 MMHG 78 (L)   HCO3 (POC) Latest Ref Range: 22 - 26 MMOL/L 17.0 (L)   sO2 (POC) Latest Ref Range: 92 - 97 % 96   Base deficit (POC) Latest Units: mmol/L 8   FIO2 (POC) Latest Units: % 35   Patient temp. Latest Units:   96.9   Specimen type (POC) Latest Units:   ARTERIAL   Set Rate Latest Units: bpm 14   Site Latest Units:   LEFT RADIAL   Device: Latest Units:   VENT   Total resp. rate Latest Units:   14   Mode Latest Units:   ASSIST CONTROL   Tidal volume Latest Units: ml 500   Volume control plus Latest Units:   YES   PEEP/CPAP (POC) Latest Units: cmH2O 5   Allens test (POC) Latest Units:   YES       1635-Pt. Remains on ventilator ACVC+ rate-12, VT-400, PEEP-5, FIO2-35%. O2 sats-99% HR-122, RR-12. ETT remains patent and secure. ABG drawn results below. Pt. To undergo apnea test. FIO2 increased to 100%  For apnea test-[facility]  Results for Aye Tavares (MRN [id number]) as of 5/29/2018 16:35   Ref.  Range 5/29/2018 16:19   pH (POC) Latest Ref Range: 7.35 - 7.45   7.293 (L)   pCO2 (POC) Latest Ref Range: 35.0 - 45.0 MMHG 36.8   pO2 (POC) Latest Ref Range: 80 - 100 MMHG 81   HCO3 (POC) Latest Ref Range: 22 - 26 MMOL/L 17.9 (L)   sO2 (POC) Latest Ref Range: 92 - 97 % 95   Base deficit (POC) Latest Units: mmol/L 9   FIO2 (POC) Latest Units: % 35   Patient temp. Latest Units:   97.4   Specimen type (POC) Latest Units:   ARTERIAL   Set Rate Latest Units: bpm 12   Site Latest Units:   LEFT RADIAL   Device: Latest Units:   VENT   Total resp. rate Latest Units:   12   Mode Latest Units:   ASSIST CONTROL   Tidal volume Latest Units: ml 400   Volume control plus Latest Units:   YES   PEEP/CPAP (POC) Latest Units: cmH2O 5   Allens test (POC) Latest Units:   YES       -1730-Apnea test performed with MD and PA at bedside. Pt. Then returned to full support.  -[address]

## 2018-05-29 NOTE — PROGRESS NOTES
Problem: Falls - Risk of  Goal: *Absence of Falls  Document Enrique Fall Risk and appropriate interventions in the flowsheet. Outcome: Progressing Towards Goal  Fall Risk Interventions:            Medication Interventions: Bed/chair exit alarm    Elimination Interventions: Bed/chair exit alarm             Problem: Pressure Injury - Risk of  Goal: *Prevention of pressure injury  Document David Scale and appropriate interventions in the flowsheet.    Outcome: Progressing Towards Goal  Pressure Injury Interventions:  Sensory Interventions: Assess changes in LOC    Moisture Interventions: Absorbent underpads    Activity Interventions: Pressure redistribution bed/mattress(bed type)    Mobility Interventions: HOB 30 degrees or less    Nutrition Interventions: Document food/fluid/supplement intake    Friction and Shear Interventions: HOB 30 degrees or less

## 2018-05-29 NOTE — PROGRESS NOTES
VENTILATOR Care plan    Problem: Ventilator Management  Goal: *Adequate oxygenation/ ventilation/ and extubation      Patient:        Pilar Al     82 y.o.   female     5/29/2018  9:46 AM  Patient Active Problem List   Diagnosis Code    Hypertension I10    AF (atrial fibrillation) ([facility]) I48.91    Depression F32.9    IBS (irritable bowel syndrome) K58.9    Hx of bladder cancer Z85.51    Attention to urostomy ([facility]) Z43.6    Unstable angina ([facility]) I20.0    CKD (chronic kidney disease), stage III N18.3    Chronic diastolic heart failure (HCC) I50.32    Cervical radiculopathy M54.12    BMI 33.0-33.9,adult Z68.33    ICH (intracerebral hemorrhage) ([facility]) I61.9    Respiratory failure ([facility]) J96.90    UTI (urinary tract infection) N39.0    Encephalopathy G93.40       ICH (intracerebral hemorrhage) (HCC)  ICH (intracerebral hemorrhage) (HCC)  Respiratory failure (HCC)  Encephalopathy  UTI (urinary tract infection)    Reason patient intubated: Intracerebral hemorrhage    Ventilator day: 2    Ventilator settings:ACVC+ rate-14, VT-500, PEEP-5, FIO2-35%    ETT Size/Placement: 7.5, 23@ lip       ABG:  Date:5/29/2018  No results found for: PH, PHI, PCO2, PCO2I, PO2, PO2I, HCO3, HCO3I, FIO2, FIO2I    Chest X-ray:  Date:5/29/2018    Results from Hospital Encounter encounter on 05/28/18   XR CHEST PORT   Narrative CHEST AP PORTABLE    Indication: Endotracheal tube placement. Comparison: 05/28/2018 at 17:20 hours. Findings: Endotracheal tube tip is 4.4 cm and the munir. NG tube tip is looped  in the left upper quadrant and stable. Monitoring leads overlie the chest.    Again noted is low lung volume. Interval increase in bibasal opacities, now  obscuring the diaphragm and partially obscuring the cardiac silhouette. No  evidence for pneumothorax or pleural effusion. Cardiac silhouette and pulmonary  vascularity appear within normal limits. Prior cervical spine fusion noted. Impression IMPRESSION:    1. Endotracheal tube and NG tube appear stable. 2. Low lung volume with interval increase in bibasal atelectasis versus less  probable infiltrate.         Lab Test:  Date:5/29/2018  WBC:   Lab Results   Component Value Date/Time    WBC 7.8 05/29/2018 03:45 AM   HGB: Lab Results   Component Value Date/Time    HGB 8.1 (L) 05/29/2018 03:45 AM    PLTS: Lab Results   Component Value Date/Time    PLATELET 437 66/83/0422 03:45 AM       SaO2%/flow:   SpO2 Readings from Last 1 Encounters:   05/29/18 100%       Vital Signs:   Patient Vitals for the past 8 hrs:   Temp Pulse Resp BP SpO2   05/29/18 0830 (!) 94.7 °F (34.8 °C) (!) 114 14 101/58 100 %   05/29/18 0824 - (!) 113 14 - 100 %   05/29/18 0815 (!) 94.7 °F (34.8 °C) (!) 116 14 92/52 100 %   05/29/18 0800 (!) 94.6 °F (34.8 °C) (!) 121 14 132/55 100 %   05/29/18 0730 (!) 94.5 °F (34.7 °C) (!) 114 14 112/50 100 %   05/29/18 0700 (!) 94.3 °F (34.6 °C) (!) 112 14 (!) 88/52 100 %   05/29/18 0636 - - - 91/45 -   05/29/18 0630 - - - 90/41 -   05/29/18 0615 - - - 99/54 -   05/29/18 0600 (!) 94 °F (34.4 °C) (!) 115 14 109/57 100 %   05/29/18 0520 (!) 94.2 °F (34.6 °C) (!) 114 14 (!) 68/45 100 %   05/29/18 0515 (!) 94.2 °F (34.6 °C) (!) 114 14 (!) 54/43 100 %   05/29/18 0502 (!) 94 °F (34.4 °C) (!) 115 14 (!) 77/57 100 %   05/29/18 0500 (!) 94 °F (34.4 °C) (!) 119 14 (!) 90/29 100 %   05/29/18 0445 (!) 93.9 °F (34.4 °C) (!) 102 14 119/58 100 %   05/29/18 0430 (!) 93.8 °F (34.3 °C) (!) 102 14 98/49 100 %   05/29/18 0415 (!) 93.8 °F (34.3 °C) (!) 104 14 97/56 100 %   05/29/18 0400 (!) 93.8 °F (34.3 °C) (!) 102 14 (!) 83/47 100 %   05/29/18 0353 - (!) 104 14 - 100 %   05/29/18 0300 (!) 93.3 °F (34.1 °C) (!) 103 14 92/66 100 %   05/29/18 0200 (!) 93.1 °F (33.9 °C) 95 14 (!) 84/63 100 %       Wean Screen Pass (Yes or No):Yes    Wean Trial Failure: Yes, due to apnea    Duration of Weaning Trial:     Additional Comments:No cough or gag reflex noted        PLAN OF CARE: Maintain ventilatory support       GOAL:Adequate oxygenation, Ventilation      OUTCOME:

## 2018-05-29 NOTE — PROGRESS NOTES
Pulmonary progress note    The patient's clinical status suggested brain death. Following hospital protocol as documented in the paper chart, I performed the brain death testing by clinical exam with the assistance of Joanna Fraser PA-C. An apnea test was then performed with the aid of respiratory therapy and the nursing staff. The results of the test are documented in the paper chart. At the end of the test the patient's ABG demonstrated hypercapnia, hypoxemia and mixed acidosis. There was no evidence of respiratory effort. There was no arrhythmia or hypoxemia. The patient by my professional judgment is dead. Per hospital policy a second physician will repeat the clinical exam for formal confirmation of brain death. Total time involved in testing 45 minutes.

## 2018-05-29 NOTE — PROGRESS NOTES
Patient in bed on back with head elevated - BBS equal and diminished - patient suctioned by RN, no gag - ETt secure at 23 at the lips and moved to the center of the mouth - MVB  At [facility] - vent alarms on and functional

## 2018-05-29 NOTE — PROGRESS NOTES
ARU/IPR REFERRAL CONTACT NOTE  [id number] [facility]    Re: Patrick Rice    Follow up on IP Consult for IP Rehab Screen. Current status reviewed - on vent and with no response to voice or noxious stimuli. Patient is not a candidate for IPR at this time. Available to re-review should status change. Thank you for the consult.     [address]ley [address]

## 2018-05-29 NOTE — PROGRESS NOTES
Progress Note      Patient: Rosario Wilson               Sex: female          DOA: 5/28/2018       YOB: 1935      Age:  82 y.o.        LOS:  LOS: 1 day               Subjective: Nobie Mon a 82 y.o. female with h/o afib,copd,hypertension,who presented as a transfer from [facility] intubated with hemorrhagic stroke. Patient was on Eliquis for AFIB . CT head shows large rt intraparenchymal cerebral hemorrhage inv ventricles. Tele neurology reported non surgical mgt. Prior to arrival in ED was intubated and given mannitol 100 gr and Keppra. Patient's prognosis is poor. I have discussed with son early over the phone and he has decided not to be aggressive. Son patient was made partial code. After the intensivist saw patient today and discussed the case with patient's son,the son decided that patient be made DNR. Objective:      Visit Vitals    BP 92/52    Pulse (!) 113    Temp (!) 94.7 °F (34.8 °C)    Resp 14    Wt 98.6 kg (217 lb 6.4 oz)    SpO2 100%    BMI 36.18 kg/m2       Physical Exam   Constitutional: She is intubated. Eyes: Conjunctivae are normal. No scleral icterus. Neck: Neck supple. Cardiovascular: Normal rate, regular rhythm and normal heart sounds. Pulmonary/Chest: She is intubated. No respiratory distress. She has no wheezes. She has no rales. Abdominal: Soft. Bowel sounds are normal.   Musculoskeletal: She exhibits no edema. Neurological: She is unresponsive. Skin: Skin is warm. No rash noted. No erythema. No pallor.         Intake and Output:  Current Shift:  05/29 0701 - 05/29 1900  In: 78.9 [I.V.:78.9]  Out: 0   Last three shifts:  05/27 1901 - 05/29 0700  In: 2572.3 [I.V.:1938.3]  Out: 2350 [Urine:2000]    Recent Results (from the past 48 hour(s))   CBC WITH AUTOMATED DIFF    Collection Time: 05/28/18 12:25 AM   Result Value Ref Range    WBC 8.4 4.6 - 13.2 K/uL    RBC 3.67 (L) 4.20 - 5.30 M/uL    HGB 11.6 (L) 12.0 - 16.0 g/dL    HCT 33.5 (L) 35.0 - 45.0 % MCV 91.3 74.0 - 97.0 FL    MCH 31.6 24.0 - 34.0 PG    MCHC 34.6 31.0 - 37.0 g/dL    RDW 16.6 (H) 11.6 - 14.5 %    PLATELET 143 713 - 690 K/uL    MPV 10.0 9.2 - 11.8 FL    NEUTROPHILS 32 (L) 40 - 73 %    LYMPHOCYTES 55 (H) 21 - 52 %    MONOCYTES 9 3 - 10 %    EOSINOPHILS 4 0 - 5 %    BASOPHILS 0 0 - 2 %    ABS. NEUTROPHILS 2.7 1.8 - 8.0 K/UL    ABS. LYMPHOCYTES 4.6 (H) 0.9 - 3.6 K/UL    ABS. MONOCYTES 0.8 0.05 - 1.2 K/UL    ABS. EOSINOPHILS 0.4 0.0 - 0.4 K/UL    ABS. BASOPHILS 0.0 0.0 - 0.1 K/UL    DF AUTOMATED     METABOLIC PANEL, BASIC    Collection Time: 05/28/18 12:25 AM   Result Value Ref Range    Sodium 142 136 - 145 mmol/L    Potassium 3.9 3.5 - 5.5 mmol/L    Chloride 111 (H) 100 - 108 mmol/L    CO2 21 21 - 32 mmol/L    Anion gap 10 3.0 - 18 mmol/L    Glucose 118 (H) 74 - 99 mg/dL    BUN 26 (H) 7.0 - 18 MG/DL    Creatinine 2.28 (H) 0.6 - 1.3 MG/DL    BUN/Creatinine ratio 11 (L) 12 - 20      GFR est AA 25 (L) >60 ml/min/1.73m2    GFR est non-AA 21 (L) >60 ml/min/1.73m2    Calcium 8.4 (L) 8.5 - 10.1 MG/DL   PTT    Collection Time: 05/28/18 12:25 AM   Result Value Ref Range    aPTT 92.6 (H) 23.0 - 36.4 SEC   PROTHROMBIN TIME + INR    Collection Time: 05/28/18 12:25 AM   Result Value Ref Range    Prothrombin time 56.6 (H) 11.5 - 15.2 sec    INR 6.6 (HH) 0.8 - 1.2     GLUCOSE, POC    Collection Time: 05/28/18 12:38 AM   Result Value Ref Range    Glucose (POC) 133 (H) 70 - 110 mg/dL   POC G3    Collection Time: 05/28/18  1:32 AM   Result Value Ref Range    Device: VENT      FIO2 (POC) 100 %    pH (POC) 7.370 7.35 - 7.45      pCO2 (POC) 36.4 35.0 - 45.0 MMHG    pO2 (POC) 385 (H) 80 - 100 MMHG    HCO3 (POC) 21.1 (L) 22 - 26 MMOL/L    sO2 (POC) 100 (H) 92 - 97 %    Base deficit (POC) 4 mmol/L    Mode ASSIST CONTROL      Tidal volume 500 ml    Set Rate 14 bpm    PEEP/CPAP (POC) 5 cmH2O    Allens test (POC) YES      Inspiratory Time 1.25 sec    Total resp.  rate 14      Site RIGHT RADIAL      Specimen type (POC) ARTERIAL Performed by Lizy Avila     Volume control plus YES     URINALYSIS W/ RFLX MICROSCOPIC    Collection Time: 05/28/18  2:45 AM   Result Value Ref Range    Color YELLOW      Appearance CLOUDY      Specific gravity 1.014 1.005 - 1.030      pH (UA) 5.5 5.0 - 8.0      Protein TRACE (A) NEG mg/dL    Glucose NEGATIVE  NEG mg/dL    Ketone NEGATIVE  NEG mg/dL    Bilirubin NEGATIVE  NEG      Blood MODERATE (A) NEG      Urobilinogen 1.0 0.2 - 1.0 EU/dL    Nitrites NEGATIVE  NEG      Leukocyte Esterase LARGE (A) NEG     URINE MICROSCOPIC ONLY    Collection Time: 05/28/18  2:45 AM   Result Value Ref Range    WBC TOO NUMEROUS TO COUNT 0 - 4 /hpf    RBC  0 - 5 /hpf     UNABLE TO QUANTITATE MICROSCOPIC PARAMETERS DUE TO EXCESSIVE WBCS    Bacteria 4+ (A) NEG /hpf   EKG, 12 LEAD, INITIAL    Collection Time: 05/28/18  2:56 AM   Result Value Ref Range    Ventricular Rate 85 BPM    Atrial Rate 97 BPM    QRS Duration 92 ms    Q-T Interval 402 ms    QTC Calculation (Bezet) 478 ms    Calculated P Axis 66 degrees    Calculated R Axis 46 degrees    Calculated T Axis 26 degrees    Diagnosis       Atrial fibrillation  Otherwise normal ECG      Confirmed by Aurelio Farr MD, [place] ([id number]) on 5/28/2018 10:50:18 AM     BLOOD TYPE, (ABO+RH)    Collection Time: 05/28/18  3:50 AM   Result Value Ref Range    ABO/Rh(D) B POSITIVE    TYPE & SCREEN    Collection Time: 05/28/18  3:50 AM   Result Value Ref Range    Crossmatch Expiration 05/31/2018     ABO/Rh(D) B POSITIVE     Antibody screen NEG     Comment       Specimen processed by automated Blood Bank analyzer   FFP, ALLOCATE    Collection Time: 05/28/18  5:15 AM   Result Value Ref Range    CALLED TO:  KENDAL PERDUE, 2600, AT 0725 ON 05/28/2018 BY [facility]     Unit number [id number]     Blood component type FP 24h, Thaw     Unit division 00     Status of unit ISSUED     Unit number [id number]     Blood component type FP 24h, Thaw     Unit division 00     Status of unit ISSUED    EKG, 12 LEAD, SUBSEQUENT Collection Time: 05/28/18  6:41 AM   Result Value Ref Range    Ventricular Rate 95 BPM    Atrial Rate 104 BPM    QRS Duration 88 ms    Q-T Interval 368 ms    QTC Calculation (Bezet) 462 ms    Calculated R Axis 59 degrees    Calculated T Axis 58 degrees    Diagnosis       Atrial fibrillation  Abnormal ECG  When compared with ECG of 28-MAY-2018 02:56,  Previous ECG has undetermined rhythm, needs review  Nonspecific T wave abnormality no longer evident in Inferior leads  Confirmed by Loreta Gama MD, Raman Martinez ([id number]) on 5/28/2018 4:47:50 PM     CBC W/O DIFF    Collection Time: 05/28/18 10:50 AM   Result Value Ref Range    WBC 9.5 4.6 - 13.2 K/uL    RBC 2.87 (L) 4.20 - 5.30 M/uL    HGB 9.2 (L) 12.0 - 16.0 g/dL    HCT 27.1 (L) 35.0 - 45.0 %    MCV 94.4 74.0 - 97.0 FL    MCH 32.1 24.0 - 34.0 PG    MCHC 33.9 31.0 - 37.0 g/dL    RDW 17.3 (H) 11.6 - 14.5 %    PLATELET 382 477 - 093 K/uL    MPV 10.3 9.2 - 11.8 FL   PROTHROMBIN TIME + INR    Collection Time: 05/28/18 10:50 AM   Result Value Ref Range    Prothrombin time 23.0 (H) 11.5 - 15.2 sec    INR 2.1 (H) 0.8 - 1.2     PTT    Collection Time: 05/28/18 10:50 AM   Result Value Ref Range    aPTT 44.3 (H) 23.0 - 55.1 SEC   METABOLIC PANEL, BASIC    Collection Time: 05/28/18 10:50 AM   Result Value Ref Range    Sodium 140 136 - 145 mmol/L    Potassium 4.3 3.5 - 5.5 mmol/L    Chloride 107 100 - 108 mmol/L    CO2 19 (L) 21 - 32 mmol/L    Anion gap 14 3.0 - 18 mmol/L    Glucose 166 (H) 74 - 99 mg/dL    BUN 27 (H) 7.0 - 18 MG/DL    Creatinine 2.66 (H) 0.6 - 1.3 MG/DL    BUN/Creatinine ratio 10 (L) 12 - 20      GFR est AA 21 (L) >60 ml/min/1.73m2    GFR est non-AA 17 (L) >60 ml/min/1.73m2    Calcium 8.4 (L) 8.5 - 10.1 MG/DL   CALCIUM, IONIZED    Collection Time: 05/28/18 10:50 AM   Result Value Ref Range    Ionized Calcium 0.95 (L) 1.12 - 1.32 MMOL/L   MAGNESIUM    Collection Time: 05/28/18 10:50 AM   Result Value Ref Range    Magnesium 2.0 1.6 - 2.6 mg/dL   PHOSPHORUS    Collection Time: 05/28/18 10:50 AM   Result Value Ref Range    Phosphorus 3.2 2.5 - 4.9 MG/DL   HEPATIC FUNCTION PANEL    Collection Time: 05/28/18 10:50 AM   Result Value Ref Range    Protein, total 6.7 6.4 - 8.2 g/dL    Albumin 2.9 (L) 3.4 - 5.0 g/dL    Globulin 3.8 2.0 - 4.0 g/dL    A-G Ratio 0.8 0.8 - 1.7      Bilirubin, total 0.4 0.2 - 1.0 MG/DL    Bilirubin, direct 0.2 0.0 - 0.2 MG/DL    Alk.  phosphatase 80 45 - 117 U/L    AST (SGOT) 23 15 - 37 U/L    ALT (SGPT) 17 13 - 56 U/L   LIPASE    Collection Time: 05/28/18 10:50 AM   Result Value Ref Range    Lipase 212 73 - 393 U/L   AMMONIA    Collection Time: 05/28/18 10:50 AM   Result Value Ref Range    Ammonia 43 (H) 11 - 32 UMOL/L   CARDIAC PANEL,(CK, CKMB & TROPONIN)    Collection Time: 05/28/18 10:50 AM   Result Value Ref Range     26 - 192 U/L    CK - MB 1.0 <3.6 ng/ml    CK-MB Index 0.9 0.0 - 4.0 %    Troponin-I, Qt. 0.02 0.0 - 0.045 NG/ML   SED RATE (ESR)    Collection Time: 05/28/18 10:50 AM   Result Value Ref Range    Sed rate, automated >140 (H) 0 - 30 mm/hr   LIPID PANEL    Collection Time: 05/28/18 10:50 AM   Result Value Ref Range    LIPID PROFILE          Cholesterol, total 142 <200 MG/DL    Triglyceride 198 (H) <150 MG/DL    HDL Cholesterol 47 40 - 60 MG/DL    LDL, calculated 55.4 0 - 100 MG/DL    VLDL, calculated 39.6 MG/DL    CHOL/HDL Ratio 3.0 0 - 5.0     HEMOGLOBIN A1C WITH EAG    Collection Time: 05/28/18 10:50 AM   Result Value Ref Range    Hemoglobin A1c 5.6 4.2 - 5.6 %    Est. average glucose 114 mg/dL   CORTISOL    Collection Time: 05/28/18 10:50 AM   Result Value Ref Range    Cortisol, random 8.3 3.09 - 22.40 ug/dL   DRUG SCREEN, URINE    Collection Time: 05/28/18  3:12 PM   Result Value Ref Range    BENZODIAZEPINES NEGATIVE  NEG      BARBITURATES NEGATIVE  NEG      THC (TH-CANNABINOL) NEGATIVE  NEG      OPIATES NEGATIVE  NEG      PCP(PHENCYCLIDINE) NEGATIVE  NEG      COCAINE NEGATIVE  NEG      AMPHETAMINES NEGATIVE  NEG      METHADONE NEGATIVE  NEG HDSCOM (NOTE)    EKG, 12 LEAD, SUBSEQUENT    Collection Time: 05/28/18  3:16 PM   Result Value Ref Range    Ventricular Rate 79 BPM    Atrial Rate 122 BPM    QRS Duration 90 ms    Q-T Interval 424 ms    QTC Calculation (Bezet) 486 ms    Calculated R Axis 51 degrees    Calculated T Axis 46 degrees    Diagnosis       Atrial fibrillation  Nonspecific T wave abnormality , probably digitalis effect  Prolonged QT  Abnormal ECG  When compared with ECG of 28-MAY-2018 06:41,  No significant change was found  Confirmed by Alonzo Vásquez MD, Marie Grullon ([id number]) on 5/28/2018 4:50:36 PM     GLUCOSE, POC    Collection Time: 05/28/18  6:38 PM   Result Value Ref Range    Glucose (POC) 165 (H) 70 - 110 mg/dL   EKG, 12 LEAD, SUBSEQUENT    Collection Time: 05/29/18  1:18 AM   Result Value Ref Range    Ventricular Rate 100 BPM    Atrial Rate 81 BPM    QRS Duration 92 ms    Q-T Interval 378 ms    QTC Calculation (Bezet) 487 ms    Calculated R Axis 49 degrees    Calculated T Axis 54 degrees    Diagnosis       Atrial fibrillation  Prolonged QT  Abnormal ECG  When compared with ECG of 28-MAY-2018 15:16,  No significant change was found     PROTHROMBIN TIME + INR    Collection Time: 05/29/18  3:45 AM   Result Value Ref Range    Prothrombin time 26.2 (H) 11.5 - 15.2 sec    INR 2.5 (H) 0.8 - 1.2     CBC WITH AUTOMATED DIFF    Collection Time: 05/29/18  3:45 AM   Result Value Ref Range    WBC 7.8 4.6 - 13.2 K/uL    RBC 2.62 (L) 4.20 - 5.30 M/uL    HGB 8.1 (L) 12.0 - 16.0 g/dL    HCT 24.5 (L) 35.0 - 45.0 %    MCV 93.5 74.0 - 97.0 FL    MCH 30.9 24.0 - 34.0 PG    MCHC 33.1 31.0 - 37.0 g/dL    RDW 17.6 (H) 11.6 - 14.5 %    PLATELET 208 375 - 419 K/uL    MPV 10.3 9.2 - 11.8 FL    NEUTROPHILS 68 40 - 73 %    LYMPHOCYTES 20 (L) 21 - 52 %    MONOCYTES 10 3 - 10 %    EOSINOPHILS 2 0 - 5 %    BASOPHILS 0 0 - 2 %    ABS. NEUTROPHILS 5.3 1.8 - 8.0 K/UL    ABS. LYMPHOCYTES 1.5 0.9 - 3.6 K/UL    ABS. MONOCYTES 0.8 0.05 - 1.2 K/UL    ABS.  EOSINOPHILS 0.2 0.0 - 0.4 K/UL    ABS. BASOPHILS 0.0 0.0 - 0.06 K/UL    DF AUTOMATED     METABOLIC PANEL, COMPREHENSIVE    Collection Time: 05/29/18  3:45 AM   Result Value Ref Range    Sodium 141 136 - 145 mmol/L    Potassium 4.6 3.5 - 5.5 mmol/L    Chloride 110 (H) 100 - 108 mmol/L    CO2 20 (L) 21 - 32 mmol/L    Anion gap 11 3.0 - 18 mmol/L    Glucose 130 (H) 74 - 99 mg/dL    BUN 29 (H) 7.0 - 18 MG/DL    Creatinine 3.20 (H) 0.6 - 1.3 MG/DL    BUN/Creatinine ratio 9 (L) 12 - 20      GFR est AA 17 (L) >60 ml/min/1.73m2    GFR est non-AA 14 (L) >60 ml/min/1.73m2    Calcium 8.1 (L) 8.5 - 10.1 MG/DL    Bilirubin, total 0.6 0.2 - 1.0 MG/DL    ALT (SGPT) 15 13 - 56 U/L    AST (SGOT) 28 15 - 37 U/L    Alk. phosphatase 85 45 - 117 U/L    Protein, total 5.9 (L) 6.4 - 8.2 g/dL    Albumin 2.5 (L) 3.4 - 5.0 g/dL    Globulin 3.4 2.0 - 4.0 g/dL    A-G Ratio 0.7 (L) 0.8 - 1.7     PTT    Collection Time: 05/29/18  3:45 AM   Result Value Ref Range    aPTT 65.9 (H) 23.0 - 36.4 SEC   MAGNESIUM    Collection Time: 05/29/18  3:45 AM   Result Value Ref Range    Magnesium 1.8 1.6 - 2.6 mg/dL   PHOSPHORUS    Collection Time: 05/29/18  3:45 AM   Result Value Ref Range    Phosphorus 1.5 (L) 2.5 - 4.9 MG/DL         XRays were reviewed in past 24 hours    Medications Reviewed      Continued hospitalization is indicated due to [address], Encephalopathy/coma . Assessment/Plan     Principal Problem:    ICH (intracerebral hemorrhage) ([facility] [address] (5/28/2018)    Active Problems:    Respiratory failure ([facility] [address] (5/28/2018)      UTI (urinary tract infection) (5/28/2018)      Encephalopathy (5/28/2018)         PLAN:     ICH   - Hemorrhagic stroke 2/2  Eliquis   - Reversal FFP  - CT head noted with large right cerebral intraparenchymal bleed   - keep SBP < 140  - s/p mannitol 100 gr  - continue Mannitol qid   - NO ANTIPLATELETS , NO ANTICOAGULANTS   - keppra for seizure prophylaxis   - Neurology consulted.    - Intensivist consulted     Encephalopathy   - Patient is in a coma will discuss with neurology to assess brain function today  - 2/2 above      Respiratory Failure   - intubated for airway protection  - Intensivist consulted      Supra therapeutic INR   - INR 6.6 -> 2.5  - Reversal FFP ordered  - daily INR   - Prognosis poor .      UTI  - Rocephin      Hx AFIB  - Eliquis held due to [address]   - 's rate control with metoprolol 2.5 mg x 1 dose   - Monitor HR ,     DVT Prophylaxis - SCD     GI Prophylaxis      Full code     Jimenez MD Jason  May 29, 2018

## 2018-05-29 NOTE — ROUTINE PROCESS
0800 Pt received after bedside shift report from JULES PAREDES. Pt vented and unresponsive. VSS via monitor. Levophed at 16mcg/min. Bed locked and in low position. 8799 Levophed dose increased from 16mcg/min to 20mcg/min    0959 Levophed dose increased to 25mcg/min    1048 Dr. Scott Saunders at bedside earlier. Verbal orders for Metoprolol 2.5mg one time dose received. States, to continue electrolyte replacement per protocol      1230 Family at bedside    1437 Nicolas-synephrine started at 10mcg/min    1252 PICC nurse at bedside. [address] [facility] [address] Dr. Rito Chopra and MARTINEZ Kunz at bedside performing death by brain criteria testing. 0  at bedside to draw lab for Osmolality serum    Bedside shift change report given to Thanh Garrett RN (oncoming nurse) by Bernardino Winston RN (offgoing nurse).  Report included the following information SBAR, MAR, KARDEX AND RECENT RESULTS.

## 2018-05-29 NOTE — PROGRESS NOTES
Patient is intubated on the ventilator. She has failed initial attempts with SBT. She lives with her son who is her primary caregiver. She has two other children who live on the [facility]. She is hypothermic and hypotensive on Levophed. Pupils non reactive, no pain response, palliative care consult requested. Her discharge plan is dependent upon her successful extubation. She does not have LTC health insurance if she were to require LTC. Discussed on rounds. Dr Llamas Rather to discuss compassionate extubation with family. Case management following.   Matt Brooks RN

## 2018-05-29 NOTE — DIABETES MGMT
NUTRITION       Nutrition consult received. Will follow for clinical course.       Link PATSY Wells, CDE   Office:  [address] [place] [address] Pager:  [phone]

## 2018-05-29 NOTE — CONSULTS
Neurology Consult Note    Admit Date: 5/28/2018  Length of Stay: 1  Primary Care: Liane Henderson MD   Principle Problem: ICH (intracerebral hemorrhage) ([facility] [address]     Assessment:    ICH    Plan:    ICH  Devastating ICH and is currently a DNR. INR 2.5 this AM.  Treating this would not provide any benefit. Temp 95.5 currently. Warm to goal of normothermia with minimum of 96 F. Levophed gtt in place. Maintain SBP >90. AED: Keppra 500mg q12hr. Continue. Mannitol 20% 25gm every 6 hr.  Hold for serum os > 320. History: History is obtained from the chart as patient unable to provide and no family is available. Ms. Oskar Sy is an 83 yo AAF with PMH of a-fib on Warfarin, COPD, HTN, and malignant neoplasm of bladder who presented to the ED 5/28/18 0035 at [facility] with unresponsiveness and left sided weakness. She was LSN at 2350. Per [facility] note she was obtunded but followed commands on right. She was seen by teleneurology and stroke work-up was performed. CT showed she had IPH with shift. She was intubated and given mannitol, Keppra, and FFP. She was transferred to [facility] for continued care. Neurosurgery was consulted per note by intensivist but \"Neurosurgery believed that no intervention was reasonable given the clinical status\". She was made a DNR. CT 5/28/18 showed no change in size of the large complex acute ICH in right temporal and parietal lobes. Moderate mass effect however is increased probably related to vasogenic edema and possible new or at least obvious infarct involving the posterior parietal and occipital lobes. Increasing left to right shift with trapped left temporal horn which is significantly enlarged compared to earlier exam with possible transependymal resorption of CSF across the temporal horn. Small subdural hematoma involving the right frontal and parietal regions.      Results reviewed:     CT Results (most recent):    Results from [facility] encounter on 05/28/18   CT HEAD WO CONT   Narrative EXAM: CT head    INDICATION: Intracranial hemorrhage    COMPARISON: Noncontrast CT brain done earlier today    TECHNIQUE: Axial CT imaging of the head was performed without intravenous  contrast. Coronal and sagittal reconstructions were performed. One or more dose reduction techniques were used on this CT: automated exposure  control, adjustment of the mAs and/or kVp according to patient's size, and  iterative reconstruction techniques. The specific techniques utilized on this CT  exam have been documented in the patient's electronic medical record.     _______________    FINDINGS:    BRAIN AND POSTERIOR FOSSA: The large acute right cerebral hemorrhage involving  the right temporal and parietal lobes is again noted. There are areas of mixed  attenuation within this. Accurate measurement is difficult because of the  irregular shape. It does not appear significantly enlarged measuring 9 x 5.8 x  5.0 cm compared to 9.2 x 5.5 x 4.7 cm on earlier study. The amount of vasogenic  edema may be increased. There is increased right to left shift now measuring up  to 15 mm compared to 12 mm on earlier exam. Right frontal and parietal subdural  hematoma is more prominent. There continues be a large amount of  intraventricular blood. The temporal horn of the left lateral ventricle is  significantly enlarged compared to earlier study with possible periventricular  hypodensity which may represent transependymal resorption of CSF. Blood extends  into the third and fourth ventricles. New loss of gray-white matter  differentiation in the right occipital and posterior parietal lobes consistent  with acute or evolving infarct in this location. Probable secretions in the nasopharynx. _______________         Impression IMPRESSION:      1. No definite increase size in large complex acute intracerebral hematoma  involving the right temporal and parietal lobes.  Moderate mass effect however is  increased probably related to vasogenic edema and possible new or at least  obvious infarct involving the posterior parietal and occipital lobes. Increasing  left to right shift with trapped left temporal horn which is significantly  enlarged compared to earlier exam with possible transependymal resorption of CSF  across the temporal horn. 2. Small subdural hematoma involving the right frontal and parietal regions. This is probably present on prior study but is more apparent on this exam.    As the attending radiologist I have personally reviewed the study and  preliminary report, and concur with the preliminary findings. MRI Results (most recent):    Results from [place] encounter on 04/27/10   MRI PELVIS WITHOUT CONTRAST   Narrative Ordering MD: Alis Quezada D.O. Interpreted By: Nasrin Momin MD  ** FINAL **  ---------------------------------------------------------------------  INTERPRETATION:  Procedure: MRI of the pelvis without contrast.  CPT code: 37953  Indications: Bladder cancer with back pain  Procedure:  CT of the pelvis was performed without intravenous or   intra-articular contrast.  Sequences include axial T1, axial T2 with   fat saturation, coronal T1, and coronal T2 with fat saturation. Comparisons: Lumbar spine MRI 04/27/10; CT abdomen and pelvis   12/27/08  Findings:  Degenerative change present in the partially visualized lumbar   spine. No evidence of osseous metastatic disease. Degenerative   change present in both hips, with subchondral cyst formation, right   greater than left. No evidence of osteonecrosis. Mild degenerative change present in the sacroiliac joints. No   evidence of sacroiliitis. No trochanteric bursitis. The visualized muscles and tendons are   intact. There is a moderate sized anterior abdominal wall hernia in the   right lower quadrant containing nondilated loops of small bowel.     Additionally, there are multi-lobulated high T2 structures in the   pelvis bilaterally along the iliac vessels; while these likely   represent fluid-filled loops of bowel, given history of bladder   cancer, enlarged lymph nodes are not entirely excluded. IMPRESSION[de-identified]  Degenerative change present in the partially visualized lumbar   spine. No evidence of osseous metastatic disease. Degenerative   change present in both hips, with subchondral cyst formation, right   greater than left. Mild degenerative change present in the   sacroiliac joints. Moderate sized anterior abdominal wall hernia in the right lower   quadrant containing nondilated loops of small bowel. Multi-lobulated   high T2 structures in the pelvis bilaterally along the iliac   vessels; while these likely represent fluid-filled loops of bowel,   given history of bladder cancer, enlarged lymph nodes are not   entirely excluded.         Latest Hemoglobin A1C:  Lab Results   Component Value Date/Time    Hemoglobin A1c 5.6 05/28/2018 10:50 AM       Latest Cardiology Procedure:  Results for orders placed or performed during the hospital encounter of 05/28/18   EKG, 12 LEAD, INITIAL   Result Value Ref Range    Ventricular Rate 85 BPM    Atrial Rate 97 BPM    QRS Duration 92 ms    Q-T Interval 402 ms    QTC Calculation (Bezet) 478 ms    Calculated P Axis 66 degrees    Calculated R Axis 46 degrees    Calculated T Axis 26 degrees    Diagnosis       Atrial fibrillation  Otherwise normal ECG      Confirmed by Darwin Rutherford MD, [address] [place] [address] ([facility]) on 5/28/2018 10:50:18 AM         Important Labs:  No results found for: FOL, RBCF  Lab Results   Component Value Date/Time    Cholesterol, total 142 05/28/2018 10:50 AM    HDL Cholesterol 47 05/28/2018 10:50 AM    LDL, calculated 55.4 05/28/2018 10:50 AM    VLDL, calculated 39.6 05/28/2018 10:50 AM    Triglyceride 198 (H) 05/28/2018 10:50 AM    CHOL/HDL Ratio 3.0 05/28/2018 10:50 AM     Lab Results   Component Value Date/Time    TSH 1.55 06/07/2017 10:14 AM    T4, Free 0.8 07/28/2012 01:25 AM    T4, Total 9.8 06/07/2017 10:14 AM     No results found for: DS35, PHEN, PHENO, PHENT, DILF, DS39, PHENY, PTN, VALF2, VALAC, VALP, VALPR, DS6, CRBAM, CRBAMP, CARB2, XCRBAM  Discussed with: nursing    Allergies:    Allergies   Allergen Reactions    Dairy-Aid Unknown (comments)     Dairy products      Lyrica [Pregabalin] Other (comments)     lightheaded    Vicodin [Hydrocodone-Acetaminophen] Nausea Only      Review of Systems: unable to obtain   Y  N   Constitutional: [] [] recent weight change  [] [] fever  [] [] sleep difficulties   ENT/Mouth:  [] [] hearing loss  [] [] swallowing problems  [] [] slurred speech   Cardiovascular:  [] [] chest pain   [] [] palpitations    Respiratory: [] [] cough with swallow  [] [] shortness of breath  [] [] sleep apnea  [] [] intubated   Gastrointestinal: [] [] abdominal pain  [] [] nausea   Genitourinary: [] [] frequent urination  [] [] incontinence    Musculoskeletal:   [] [] joint pain  [] [] muscle pain   Integument:   [] [] rash/itching   Neurological:  [] [] dizziness/vertigo  [] [] sedation  [] [] confusion  [] [] agitation/combativeness  [] [] loss of consciousness  [] [] numbness/tingling sensation  [] [] tremors  [] [] weakness in limbs  [] [] difficulty with balance  [] [] frequent or recurring headaches  [] [] memory loss   [] [] comatose  [] [] seizures   Psychiatric:   [] [] depression  [] [] hallucinations   Endocrine: [] [] excessive thirst or urination   [] [] heat or cold intolerance   Hematologic/Lymphatic: [] [] bleeding tendency  [] [] enlarged lymph nodes     PMH:   Past Medical History:   Diagnosis Date    Arthritis     [facility]     Atrial fibrillation ([facility]      in sr post cardioversion off multaq due to bradycardia    Back pain     BMI 33.0-33.9,adult 5/22/2017    Cancer ([facility] [address] 1997    bladder    COPD (chronic obstructive pulmonary disease) ([facility]     Depression     Diarrhea     Essential hypertension, benign STABLE    Glaucoma     Hip pain     Hypertension     IBS (irritable bowel syndrome)     Leg pain     Malignant neoplasm of bladder     Menopause     Mood disorder (HCC) 12/17/97    due to major surgery, mixed depressive anxious features        Problem List: Principal Problem:    ICH (intracerebral hemorrhage) ([facility] [address] (5/28/2018)    Active Problems:    Respiratory failure ([facility] [address] (5/28/2018)      UTI (urinary tract infection) (5/28/2018)      Encephalopathy (5/28/2018)        FH:   Family History   Problem Relation Age of Onset    Heart Disease Neg Hx         SH:   Social History     Social History    Marital status:      Spouse name: N/A    Number of children: N/A    Years of education: N/A     Social History Main Topics    Smoking status: Current Some Day Smoker     Packs/day: 0.25    Smokeless tobacco: Never Used    Alcohol use No    Drug use: No    Sexual activity: Not Asked     Other Topics Concern    None     Social History Narrative          Vital Signs:   Visit Vitals    /58    Pulse (!) 114    Temp (!) 94.7 °F (34.8 °C)    Resp 14    Wt 98.6 kg (217 lb 6.4 oz)    SpO2 100%    BMI 36.18 kg/m2      Neurological examination:    Appearance: Appears ill   Cardiovascular: Heart is tachycardic and irregular.  Mental status examination: No eye opening to voice or noxious stimulation. No movements noted spontaneously or to noxious stimulation.  Cranial Nerves:     I: smell Not tested   II: visual fields No blink to visual threat   II: pupils Non-reactive at 5mm   III,VII: ptosis none   III,IV,VI: extraocular muscles  Oculocephalic absent   V: facial light touch sensation     V,VII: corneal reflex  none   VII: facial muscle function     VIII: hearing    IX: soft palate elevation      IX,X: gag reflex none   XI: sternocleidomastoid strength    XII: tongue strength          Motor exam: Station, gait:  deferred. Flaccid x 4. Spacticity absent.       Sensory: no response to painful stim   Coordination: unable to test   Reflexes: Symmetric and 2+ in bilateral biceps, triceps, and brachioradialis. 1+patellar.  0 ankle reflexes. Plantar reflexes are mute.             Medications:      [x] REVIEWED  Current Facility-Administered Medications   Medication    NOREPINephrine (LEVOPHED) 8 mg in 0.9% NS 250ml infusion    0.9% sodium chloride infusion    0.9% sodium chloride infusion 250 mL    ELECTROLYTE REPLACEMENT PROTOCOL    ELECTROLYTE REPLACEMENT PROTOCOL    ELECTROLYTE REPLACEMENT PROTOCOL    PHARMACY INFORMATION NOTE    ondansetron (ZOFRAN) injection 2 mg    labetalol (NORMODYNE;TRANDATE) 20 mg/4 mL (5 mg/mL) injection 5 mg    acetaminophen (TYLENOL) tablet 650 mg    acetaminophen (TYLENOL) suppository 650 mg    senna-docusate (PERICOLACE) 8.6-50 mg per tablet 2 Tab    bisacodyl (DULCOLAX) tablet 5 mg    bisacodyl (DULCOLAX) suppository 10 mg    pantoprazole (PROTONIX) tablet 40 mg    Or    pantoprazole (PROTONIX) granules for oral suspension 40 mg    Or    pantoprazole (PROTONIX) 40 mg in sodium chloride 0.9% 10 mL injection    albuterol (PROVENTIL VENTOLIN) nebulizer solution 2.5 mg    folic acid (FOLVITE) 1 mg, thiamine (B-1) 100 mg in 0.9% sodium chloride 50 mL ivpb    cefTRIAXone (ROCEPHIN) 1 g in 0.9% sodium chloride (MBP/ADV) 50 mL MBP    levETIRAcetam (KEPPRA) 500 mg in saline (iso-osm) 100 ml IVPB    mannitol (OSMITROL) 20 % infusion 25 g     Data:      [x] REVIEWED  Recent Results (from the past 24 hour(s))   CBC W/O DIFF    Collection Time: 05/28/18 10:50 AM   Result Value Ref Range    WBC 9.5 4.6 - 13.2 K/uL    RBC 2.87 (L) 4.20 - 5.30 M/uL    HGB 9.2 (L) 12.0 - 16.0 g/dL    HCT 27.1 (L) 35.0 - 45.0 %    MCV 94.4 74.0 - 97.0 FL    MCH 32.1 24.0 - 34.0 PG    MCHC 33.9 31.0 - 37.0 g/dL    RDW 17.3 (H) 11.6 - 14.5 %    PLATELET 073 230 - 764 K/uL    MPV 10.3 9.2 - 11.8 FL   PROTHROMBIN TIME + INR    Collection Time: 05/28/18 10:50 AM   Result Value Ref Range    Prothrombin time 23.0 (H) 11.5 - 15.2 sec    INR 2.1 (H) 0.8 - 1.2     PTT    Collection Time: 05/28/18 10:50 AM   Result Value Ref Range    aPTT 44.3 (H) 23.0 - 27.1 SEC   METABOLIC PANEL, BASIC    Collection Time: 05/28/18 10:50 AM   Result Value Ref Range    Sodium 140 136 - 145 mmol/L    Potassium 4.3 3.5 - 5.5 mmol/L    Chloride 107 100 - 108 mmol/L    CO2 19 (L) 21 - 32 mmol/L    Anion gap 14 3.0 - 18 mmol/L    Glucose 166 (H) 74 - 99 mg/dL    BUN 27 (H) 7.0 - 18 MG/DL    Creatinine 2.66 (H) 0.6 - 1.3 MG/DL    BUN/Creatinine ratio 10 (L) 12 - 20      GFR est AA 21 (L) >60 ml/min/1.73m2    GFR est non-AA 17 (L) >60 ml/min/1.73m2    Calcium 8.4 (L) 8.5 - 10.1 MG/DL   CALCIUM, IONIZED    Collection Time: 05/28/18 10:50 AM   Result Value Ref Range    Ionized Calcium 0.95 (L) 1.12 - 1.32 MMOL/L   MAGNESIUM    Collection Time: 05/28/18 10:50 AM   Result Value Ref Range    Magnesium 2.0 1.6 - 2.6 mg/dL   PHOSPHORUS    Collection Time: 05/28/18 10:50 AM   Result Value Ref Range    Phosphorus 3.2 2.5 - 4.9 MG/DL   HEPATIC FUNCTION PANEL    Collection Time: 05/28/18 10:50 AM   Result Value Ref Range    Protein, total 6.7 6.4 - 8.2 g/dL    Albumin 2.9 (L) 3.4 - 5.0 g/dL    Globulin 3.8 2.0 - 4.0 g/dL    A-G Ratio 0.8 0.8 - 1.7      Bilirubin, total 0.4 0.2 - 1.0 MG/DL    Bilirubin, direct 0.2 0.0 - 0.2 MG/DL    Alk.  phosphatase 80 45 - 117 U/L    AST (SGOT) 23 15 - 37 U/L    ALT (SGPT) 17 13 - 56 U/L   LIPASE    Collection Time: 05/28/18 10:50 AM   Result Value Ref Range    Lipase 212 73 - 393 U/L   AMMONIA    Collection Time: 05/28/18 10:50 AM   Result Value Ref Range    Ammonia 43 (H) 11 - 32 UMOL/L   CARDIAC PANEL,(CK, CKMB & TROPONIN)    Collection Time: 05/28/18 10:50 AM   Result Value Ref Range     26 - 192 U/L    CK - MB 1.0 <3.6 ng/ml    CK-MB Index 0.9 0.0 - 4.0 %    Troponin-I, Qt. 0.02 0.0 - 0.045 NG/ML   SED RATE (ESR)    Collection Time: 05/28/18 10:50 AM   Result Value Ref Range    Sed rate, automated >140 (H) 0 - 30 mm/hr   LIPID PANEL    Collection Time: 05/28/18 10:50 AM   Result Value Ref Range    LIPID PROFILE          Cholesterol, total 142 <200 MG/DL    Triglyceride 198 (H) <150 MG/DL    HDL Cholesterol 47 40 - 60 MG/DL    LDL, calculated 55.4 0 - 100 MG/DL    VLDL, calculated 39.6 MG/DL    CHOL/HDL Ratio 3.0 0 - 5.0     HEMOGLOBIN A1C WITH EAG    Collection Time: 05/28/18 10:50 AM   Result Value Ref Range    Hemoglobin A1c 5.6 4.2 - 5.6 %    Est. average glucose 114 mg/dL   CORTISOL    Collection Time: 05/28/18 10:50 AM   Result Value Ref Range    Cortisol, random 8.3 3.09 - 22.40 ug/dL   DRUG SCREEN, URINE    Collection Time: 05/28/18  3:12 PM   Result Value Ref Range    BENZODIAZEPINES NEGATIVE  NEG      BARBITURATES NEGATIVE  NEG      THC (TH-CANNABINOL) NEGATIVE  NEG      OPIATES NEGATIVE  NEG      PCP(PHENCYCLIDINE) NEGATIVE  NEG      COCAINE NEGATIVE  NEG      AMPHETAMINES NEGATIVE  NEG      METHADONE NEGATIVE  NEG      HDSCOM (NOTE)    EKG, 12 LEAD, SUBSEQUENT    Collection Time: 05/28/18  3:16 PM   Result Value Ref Range    Ventricular Rate 79 BPM    Atrial Rate 122 BPM    QRS Duration 90 ms    Q-T Interval 424 ms    QTC Calculation (Bezet) 486 ms    Calculated R Axis 51 degrees    Calculated T Axis 46 degrees    Diagnosis       Atrial fibrillation  Nonspecific T wave abnormality , probably digitalis effect  Prolonged QT  Abnormal ECG  When compared with ECG of 28-MAY-2018 06:41,  No significant change was found  Confirmed by Sergio Goodman MD, Marina Bryson ([id number]) on 5/28/2018 4:50:36 PM     GLUCOSE, POC    Collection Time: 05/28/18  6:38 PM   Result Value Ref Range    Glucose (POC) 165 (H) 70 - 110 mg/dL   EKG, 12 LEAD, SUBSEQUENT    Collection Time: 05/29/18  1:18 AM   Result Value Ref Range    Ventricular Rate 100 BPM    Atrial Rate 81 BPM    QRS Duration 92 ms    Q-T Interval 378 ms    QTC Calculation (Bezet) 487 ms    Calculated R Axis 49 degrees    Calculated T Axis 54 degrees    Diagnosis Atrial fibrillation  Prolonged QT  Abnormal ECG  When compared with ECG of 28-MAY-2018 15:16,  No significant change was found     PROTHROMBIN TIME + INR    Collection Time: 05/29/18  3:45 AM   Result Value Ref Range    Prothrombin time 26.2 (H) 11.5 - 15.2 sec    INR 2.5 (H) 0.8 - 1.2     CBC WITH AUTOMATED DIFF    Collection Time: 05/29/18  3:45 AM   Result Value Ref Range    WBC 7.8 4.6 - 13.2 K/uL    RBC 2.62 (L) 4.20 - 5.30 M/uL    HGB 8.1 (L) 12.0 - 16.0 g/dL    HCT 24.5 (L) 35.0 - 45.0 %    MCV 93.5 74.0 - 97.0 FL    MCH 30.9 24.0 - 34.0 PG    MCHC 33.1 31.0 - 37.0 g/dL    RDW 17.6 (H) 11.6 - 14.5 %    PLATELET 694 490 - 544 K/uL    MPV 10.3 9.2 - 11.8 FL    NEUTROPHILS 68 40 - 73 %    LYMPHOCYTES 20 (L) 21 - 52 %    MONOCYTES 10 3 - 10 %    EOSINOPHILS 2 0 - 5 %    BASOPHILS 0 0 - 2 %    ABS. NEUTROPHILS 5.3 1.8 - 8.0 K/UL    ABS. LYMPHOCYTES 1.5 0.9 - 3.6 K/UL    ABS. MONOCYTES 0.8 0.05 - 1.2 K/UL    ABS. EOSINOPHILS 0.2 0.0 - 0.4 K/UL    ABS. BASOPHILS 0.0 0.0 - 0.06 K/UL    DF AUTOMATED     METABOLIC PANEL, COMPREHENSIVE    Collection Time: 05/29/18  3:45 AM   Result Value Ref Range    Sodium 141 136 - 145 mmol/L    Potassium 4.6 3.5 - 5.5 mmol/L    Chloride 110 (H) 100 - 108 mmol/L    CO2 20 (L) 21 - 32 mmol/L    Anion gap 11 3.0 - 18 mmol/L    Glucose 130 (H) 74 - 99 mg/dL    BUN 29 (H) 7.0 - 18 MG/DL    Creatinine 3.20 (H) 0.6 - 1.3 MG/DL    BUN/Creatinine ratio 9 (L) 12 - 20      GFR est AA 17 (L) >60 ml/min/1.73m2    GFR est non-AA 14 (L) >60 ml/min/1.73m2    Calcium 8.1 (L) 8.5 - 10.1 MG/DL    Bilirubin, total 0.6 0.2 - 1.0 MG/DL    ALT (SGPT) 15 13 - 56 U/L    AST (SGOT) 28 15 - 37 U/L    Alk.  phosphatase 85 45 - 117 U/L    Protein, total 5.9 (L) 6.4 - 8.2 g/dL    Albumin 2.5 (L) 3.4 - 5.0 g/dL    Globulin 3.4 2.0 - 4.0 g/dL    A-G Ratio 0.7 (L) 0.8 - 1.7     PTT    Collection Time: 05/29/18  3:45 AM   Result Value Ref Range    aPTT 65.9 (H) 23.0 - 36.4 SEC   MAGNESIUM    Collection Time: 05/29/18 3:45 AM   Result Value Ref Range    Magnesium 1.8 1.6 - 2.6 mg/dL   PHOSPHORUS    Collection Time: 05/29/18  3:45 AM   Result Value Ref Range    Phosphorus 1.5 (L) 2.5 - 4.9 MG/DL       Reji Law NP

## 2018-05-29 NOTE — CDMP QUERY
Please clarify if this patient is being treated/managed for:    =>Vasogenic Edema  =>Other Explanation of clinical findings  =>Unable to Determine (no explanation of clinical findings)    The medical record reflects the following:    Risk:  Admitted with [facility]  with Herniation     Clinical Indicators:  Ct indicates vasogenic edema     Treatment:  Mannitol,  FFP, neuro consult,      Please clarify and document your clinical opinion in the progress notes and discharge summary including the definitive and/or presumptive diagnosis, (suspected or probable), related to the above clinical findings. Please include clinical findings supporting your diagnosis. If you DECLINE this query or would like to communicate with [employer], please utilize the \"[employer] message box\" at the TOP of the Progress Note on the right.       Thank you,  Cayden Sam RN/CCDS  [phone]

## 2018-05-29 NOTE — PROGRESS NOTES
Parish Yap in to see patient and perform second death by brain criteria test. Time of death 200. Patient's son called and message left for him to call hospital.      Paged Dr. Hernando Hall, hospitalist on call to inform of patient's status. Izabela Davis called to inform of time of death. Feroz Hoover notified. Ryan Bello has declined all organs and gave permission to release the patient to the  home. ISAURO Ruiz talking to patient's son on telephone now. [address] notified of patient's status. Nursing to call him back if family arrives tonight.

## 2018-05-29 NOTE — PROGRESS NOTES
responded to the death of  Guerita Landaverde, who was a 82 y.o.,female,     The  provided the following Interventions:  Provided crisis spiritual support and grief interventions. Offered prayers on behalf of the patient. Chart reviewed. Plan:  Chaplains will continue to follow and will provide spiritual care and grief support for the family.     [address] [facility]   Staff [address] [facility]   [phone]

## 2018-05-29 NOTE — PROGRESS NOTES
Pulmonary Progress Note    History  Pt admitted 5/28 with an intracranial hemorrhage from a supratherapeutic INR. Follow-up CT brain without contrast showed that the midline shift had progressed by report from 9 mm on admission to 13 mm. By CT scan findings and poor neurologic exam, neurosurgery did not believe that there was an indication for surgery. Central access was not placed due to bleeding risk. The patient remains unresponsive to all stimuli. She is a candidate for brain death testing. Vitals: Hypothermia resolved this morning   With aggressive pressor support the patient's systolic blood pressure was kept greater than 100 mmHg before brain death testing. Respirations stayed at the set ventilator rate   She remained tachycardic but did not show ectopy or malignant arrhythmia    Breath sounds were clear  Heart was tachycardic but without gallop  Abdomen was soft and nondistended  No peripheral cyanosis    Labs: WBC 7.8, Hgb 8.1, . No bandemia   , K4.6, , HCO3 20, BUN 29, CR 3.20. AST 28, total bilirubin 0.6, alk phos 85   Serum osmolality 334    Chest x-ray: Appropriate endotracheal tube placement. NG tube placement appropriate. Basilar atelectasis    EKG and rhythm strips: Atrial fibrillation with rapid ventricular response    Assessment  Intracranial hemorrhage from supratherapeutic INR  Atrial fibrillation with rapid ventricular response  Pressor dependent hypotension  Acute renal failure secondary to altered mental status on mechanical ventilation  Acute renal failure without uremia  Normal liver function tests    The patient appears to have suffered catastrophic brain injury and now has multisystem organ failure. Plan  Brain death testing. The patient is critically ill with multisystem organ failure.   Total critical care time without physician overlap for procedure time included: 35 minutes

## 2018-05-30 NOTE — PROGRESS NOTES
1030 Spoke to Mr. Brandi Houser on telephone after Ezekiel Comer spoke to him. Mr. Chandler Richardson verbalized that it is ok to extubate patient now.

## 2018-05-30 NOTE — PROGRESS NOTES
1111 Patient extubated.  at the bedside. 1122 SpO2 0%    1123 Patient with vent rhythm of 36 on Monitor- no pulse by doppler or auscultation- PEA. Dr. Shaun Mora in to see patient. 1133 Patient asystole on the monitor. Strips placed in chart. 1225 Patient transported to [facility] [place]. Patient had no personal belongings on admission. Death report given to security and copy given to [facility]. Official time of death by MD 5/29/2018 at 200.

## 2018-05-30 NOTE — PROGRESS NOTES
8968 Patient on full vent support: AC/VC+, RATE 12, , PEEP 5, FiO2 35%. Current vitals: , Temp 97.2, SpO2 97%, RR 12, /53. No resp distress noted.  Patient is a DNR. -PSS

## 2018-05-30 NOTE — PROGRESS NOTES
Problem: Pressure Injury - Risk of  Goal: *Prevention of pressure injury  Document David Scale and appropriate interventions in the flowsheet.    Outcome: Progressing Towards Goal  Pressure Injury Interventions:  Sensory Interventions: Pressure redistribution bed/mattress (bed type)    Moisture Interventions: Absorbent underpads    Activity Interventions: Pressure redistribution bed/mattress(bed type)    Mobility Interventions: HOB 30 degrees or less, Pressure redistribution bed/mattress (bed type)    Nutrition Interventions: Document food/fluid/supplement intake    Friction and Shear Interventions: Foam dressings/transparent film/skin sealants, HOB 30 degrees or less

## 2018-05-30 NOTE — ROUTINE PROCESS
0800 Pt received after bedside shift report from [address] [place] [address] , RN. ETT/OT in place. Pt alert. VSS via monitor. Urostomy draining to gravity with clear yellow urine. Bed locked and in low position. Levophed infusing at 30mcg/min  Neosynephrine infusing at 100mgc/min  PIV infusing at 50ml/hr    1110. Respiratory therapist Erin , at bedside to terminally extubate pt. Fredi at bedside    1111 Pt extubated. No distress noted.     1203 Pt cleaned up and placed in a bag.    1225 Pt transported to the [facility] [place]

## 2018-05-30 NOTE — PROGRESS NOTES
Pt admitted 5/28 admitted with an intracranial hemorrhage 5/28/18. Ttesting on 5/29/18 consistent with brain death. Spoke to Mr. Sridhar Subhayoselin on telephone and he verbalized that it is ok to extubate patient given exam consistent with brain death. Discuss current condition and goals of care. Answered all questions and concerns to the best of my ability.     Plan:  - Extubate now    Carie DAY

## 2018-05-30 NOTE — PROGRESS NOTES
Patient was extubated today after being declared Brain Dead last evening. No family was present.  offered prayer and left Spiritual Care brochure. Chaplains will continue to follow and will provide pastoral care on an as needed/requested basis.     Martin Mac   Spiritual Care   [phone]

## 2018-05-30 NOTE — PROGRESS NOTES
2000 Assumed care of pt after bedside report [facility] pt intubated and on vent at prescribed settings. Pt is DNR and is in process of having brain death declared. Monitor denotes uncontrolled afib. Neuro pt is unresponsive with fixed pupils. Lungs coarse diminished in bases. OGT to sxn. Urostomy in place and draining very minimal urine. 2100 Dr. Mahsa Gutierrez paged to inquire about continuing with lab draws and specimens and order given to dc same. 2130 Intensvist MARTINEZ Suarez gave order to dc all meds except pressors, Mannitol, and Keppra. 05/30/2018 0000 Accucheck result 118. No insulin given as per sliding scale protocol. Mannitol IV given as ordered without serum osmolarity result as instructed per Tomer HENRIQUEZ. 0400 Pt status unchanged. 0600Pt condition unchanged. Family has not been in to se pt but is aware of terminal wean this AM around 0800 due to declaration of brain death.

## 2023-05-23 NOTE — DISCHARGE SUMMARY
[address]    Candace Gastelum  MR#: [id number]  : 1935  ACCOUNT #: [de-identified]   ADMIT DATE: 2018  DISCHARGE DATE: 2018    ADMITTING DIAGNOSIS:  Intracranial hemorrhage. HISTORY OF PRESENT ILLNESS:  Ghazal Fields is an 47-year-old -American female who presented to the emergency department at [facility]. She was found to have hemorrhagic stroke. She was intubated and admitted for ongoing management. Ms. Savanah Aguilar had been on Eliquis for atrial fibrillation. She had intraparenchymal hemorrhage, which was bleeding into her ventricles. She was given mannitol as well as Keppra and transferred to [address] [place] [address] for ongoing management. HOSPITAL COURSE:  Pulmonology consultation, critical care was obtained on admission. She continued with management on the ventilator. She continued with mannitol and blood pressure control and anticonvulsants. Neurosurgical consultation was obtained, and it was felt that she was not a candidate for surgical management. Ms. Savanah Aguilar continued to decline and ultimately she  on 2018 as a result of her massive intracranial hemorrhage. DISCHARGE DIAGNOSES:    1. Intracranial hemorrhage. 2.  Atrial fibrillation. 3.  Acute respiratory failure. 4.  Urinary tract infection. 5.  Metabolic encephalopathy. 6.  Atrial fibrillation. 7.  Hypertension. CONDITION:  . DISPOSITION:  . This is a summary of a complex hospitalization. Please also see her electronic medical record for further details regarding her care. MD STEVE Jacome/[place]  D: 2018 23:39     T: 2018 03:23  JOB #: [id number]  CC: Gerardo Sandhu MD Is This A New Presentation, Or A Follow-Up?: Follow Up Ashlie What Type Of Rash Do You Have?: atopic dermatitis How Severe Is It?: mild How Long Have You Been Taking Dupixent?: 1.7 years

## 2024-02-09 NOTE — PROGRESS NOTES
Due to elevated to Creatinine unable to give IV contrast at this time.   Spoke with Stephanie Vega at Dr Sexton's office ok to do exam without contrast. [facility]   Physical Therapy Initial Evaluation      Date: 2/9/2024   Name: Yolanda Betts  Clinic Number: [id number]    Therapy Diagnosis:    Encounter Diagnoses   Name Primary?    Bilateral foot pain Yes    Decreased range of motion of both ankles     Decreased strength, endurance, and mobility     Plantar fasciitis, bilateral       Physician: Sasha Sorenson MD     Physician Orders: PT Eval and Treat  Medical Diagnosis from Referral: bilateral plantar fasciitis   Evaluation Date: 2/9/2024  Authorization Period Expiration: 1/29/2025  Plan of Care Expiration: 5/9/2024  Progress Note Due: 3/10/2024  Visit # / Visits authorized: 1/1   FOTO: 1/3 (last performed on 2/9/2024)    Precautions: Standard and Diabetes    Time In: 0805  Time Out: 0908  Total Billable Time (timed & untimed codes): 55 minutes    Subjective     Date of onset: two months ago    History of current condition - Yolanda reports that she started to notice bilateral foot pain insidiously about two months ago. Patient states that she just woke up one morning and felt like she couldn't walk on her feet. She has had this before in the past, and they gave her injections. When asked if the injections helped, patient states that she deals with pain on a daily basis due to fibromyalgia and degenerative arthritis in her spine. Currently, the pain in her feet is worst in the mornings. She has not had any treatment with this flare up and was just referred to PT. She does not wear a night splint.     Falls: none    Imaging: no recent images    Pain:  Current 7/10, worst 7/10, best 6/10   Location: [x] Right   [x] Left:  bilateral foot pain  Description: throbbing and sharp  Aggravating Factors: mornings, prolonged standing and walking, standing after prolonged sitting  Easing Factors: activity avoidance, rest    Prior Therapy:   [] N/A    [x] Yes: different body part  Social History: Pt lives alone  Occupation: Pt is not currently  working.   Prior Level of Function: Independent and pain free with all ADL, IADL, community mobility and functional activities.   Current Level of Function: Independent with all ADL, IADL, community mobility and functional activities with reports of increased pain and need for increased time and frequent breaks.      Dominant Extremity:    [x] Right    [] Left    Pts goals: Pt reported goals are to decrease overall pain levels in order to return to prior functional level.     Medical History:   Past Medical History:   Diagnosis Date    Abnormal Pap smear of cervix     HPV genital warts    Anemia     Anxiety     Arthritis     Asthma 10/11/2016    Bipolar 1 disorder     Diabetes mellitus, type 2     Dyslipidemia associated with type 2 diabetes mellitus 2019    General anesthetics causing adverse effect in therapeutic use     Genital warts     GERD (gastroesophageal reflux disease)     Herpes simplex virus (HSV) infection     Hyperlipidemia     Hypertension     Hypertension complicating diabetes 2019    Migraine with aura and without status migrainosus, not intractable 2016    Mild persistent asthma without complication 10/11/2016    Morbid obesity with body mass index (BMI) of 45.0 to 49.9 in adult 2017    Obstructive sleep apnea     ALEN (obstructive sleep apnea)     2L per N/C q HS    Schizoaffective disorder, bipolar type 2019    Seasonal allergic rhinitis due to pollen 2019    Type 2 diabetes mellitus with hyperglycemia, with long-term current use of insulin 2017    Type 2 diabetes mellitus with hyperglycemia, without long-term current use of insulin 2017       Surgical History:   Yolanda Betts  has a past surgical history that includes  section; Mouth surgery (); Breast surgery (Bilateral, ); Tubal ligation; Colonoscopy; Belt abdominoplasty (); Colonoscopy (N/A, 2018); Colonoscopy (N/A, 2018); Oophorectomy; Hysterectomy; abcess  removed right back; Robot-assisted cholecystectomy using da Sharonda Xi (N/A, 1/3/2020); Total Reduction Mammoplasty; Epidural steroid injection into cervical spine (N/A, 1/31/2023); Esophagogastroduodenoscopy (N/A, 3/10/2023); Colonoscopy (N/A, 3/10/2023); Carpal tunnel release (Bilateral, 12/18/2023); Injection of anesthetic agent around medial branch nerves innervating cervical facet joint (Bilateral, 12/19/2023); and Epidural steroid injection into cervical spine (N/A, 1/30/2024).    Medications:   Yolanda has a current medication list which includes the following prescription(s): alprazolam, amlodipine-valsartan, aspirin, atorvastatin, bd insulin syringe ultra-fine, blood sugar diagnostic, blood sugar diagnostic, dexcom g7 sensor, duloxetine, ergocalciferol, estradiol, fenofibrate, freestyle lisha 2 reader, fluticasone propionate, frovatriptan, furosemide, baqsimi, insulin aspart u-100, toujeo solostar u-300 insulin, lamotrigine, lancets, levocetirizine, lifitegrast, lurasidone, magnesium oxide, metformin, metoprolol succinate, mirtazapine, montelukast, omeprazole, pen needle, diabetic, pen needle, diabetic, pneumoc 20-tatiana conj-dip cr(pf), promethazine, motegrity, cosentyx pen (2 pens), spironolactone, tizanidine, tramadol, [DISCONTINUED] clonazepam, [DISCONTINUED] glucagon emergency kit (human), [DISCONTINUED] valsartan, and [DISCONTINUED] zolpidem, and the following Facility-Administered Medications: ondansetron.    Allergies:   Review of patient's allergies indicates:   Allergen Reactions    Codeine Itching    Hydromorphone Other (See Comments)     Can't wake up for long time if taken  Slow to wake up after surgery after receiving    Aleve [naproxen sodium]      Increases BP    Lyrica [pregabalin] Itching    Motrin [ibuprofen]      Increases BP    Neuromuscular blockers, steroidal Hives     some    Latex, natural rubber Rash    Morphine Rash     itching    Norco [hydrocodone-acetaminophen] Itching, Rash and  Hallucinations    Seconal [secobarbital sodium] Rash     itching    Tylox [oxycodone-acetaminophen] Rash        Objective      RANGE OF MOTION:   Ankle/Foot AROM/PROM Right Left Pain/Dysfunction with Movement Goal   Dorsiflexion (20º) -10 -8 Pain in both feet 10   Plantarflexion (50º) 41 44 Pain in both feet 50   Inversion (35º) 24 24 Pain in both feet 35   Eversion (15º) 15 15 Pain in both feet 15       STRENGTH:   L/E MMT Right Left Pain/Dysfunction with Movement Goal   Hip Flexion  3/5 3/5 Lateral and posterior trunk lean,  Pain in bilateral hips 4+/5 B   Hip Extension  NT NT  4+/5 B   Hip Abduction  3+/5 3+/5 Tested in seated 4+/5 B   Knee Extension 3+/5 3+/5 Pain in bilateral knees 5/5 B   Knee Flexion 3+/5 3+/5  5/5 B   Hip IR 3/5 3/5 Pain in bilateral hips 4+/5 B   Hip ER 3/5 3/5 Pain in bilateral hips 4+/5 B   Ankle DF 3+/5 4-/5 Pain in bilateral shins and calf musculature 5/5 B   Ankle PF 3/5 3+/5 Pain on plantar surface of each foot 5/5 B   Ankle Inversion 3+/5 3+/5 Pain in bilateral lower leg 5/5 B   Ankle Eversion 3+/5 3+/5 Pain in bilateral lower leg 5/5 B        MUSCLE LENGTH:   Muscle Tested  Right Left  Goal   Hip Flexors [] Normal  [x] Limited [] Normal  [x] Limited Normal B   ITB / TFL [] Normal  [x] Limited [] Normal  [x] Limited Normal B   Hamstrings  [] Normal  [x] Limited [] Normal  [x] Limited Normal B   Piriformis  [] Normal  [x] Limited [] Normal  [x] Limited Normal B   Gastrocnemius  [] Normal  [x] Limited [] Normal  [x] Limited Normal B   Soleus  [] Normal  [x] Limited [] Normal  [x] Limited Normal B       JOINT MOBILITY:   Joint Motion Right Mobility   Left Mobility Goal   AP Talar Glide  [] Hypo     [x] Normal     [] Hyper [] Hypo     [x] Normal     [] Hyper Normal    PA Talar Glide  [] Hypo     [x] Normal     [] Hyper [] Hypo     [x] Normal     [] Hyper Normal    Medial Talar Glide  [] Hypo     [x] Normal     [] Hyper [] Hypo     [x] Normal     [] Hyper Normal    Lateral Talar Glide  []  Hypo     [x] Normal     [] Hyper [] Hypo     [x] Normal     [] Hyper Normal    Calcaneal Inversion  [] Hypo     [x] Normal     [] Hyper [] Hypo     [x] Normal     [] Hyper Normal    Calcaneal Eversion  [] Hypo     [x] Normal     [] Hyper [] Hypo     [x] Normal     [] Hyper Normal    Midfoot:  [] Hypo     [x] Normal     [] Hyper [] Hypo     [x] Normal     [] Hyper Normal    Forefoot:  [] Hypo     [x] Normal     [] Hyper [] Hypo     [x] Normal     [] Hyper Normal        SPECIAL TESTS:    Right  (spine) Left  Goal   Anterior Drawer [] Positive    [x] Negative [] Positive    [x] Negative Negative B    Tarsal Tunnel Test  [] Positive    [x] Negative [] Positive    [x] Negative Negative B    Squeeze Test (Syndesmosis) [] Positive    [x] Negative [] Positive    [x] Negative Negative B          SENSATION  [x] Intact to Light Touch   [] Impaired:      PALPATION: Muscles: Increased tone and tenderness to palpation of: bilateral gastrocnemius, soleus, peroneals, posterior tibialis, anterior tibialis , foot intrinsics. Structures: Increased tenderness to palpation of: bilateral LOWER STRUCTURES : plantar fascia      POSTURE:  Pt presents with postural abnormalities which include:    [x] Forward Head   [] Increased Lumbar Lordosis   [x] Rounded Shoulder   [] Genu Recurvatum   [] Increased Thoracic Kyphosis [] Genu Valgus   [] Trunk Deviated    [] Pes Planus   [] Scapular Winging    [] Other:         GAIT ANALYSIS The patient ambulated with the following assistive device: none; the pt presents with the following gait abnormalities: bradykinetic, increased SARWAT, decreased step length bilateral, decreased heel strike bilateral, decreased push off bilateral, decreased hip extension bilateral, and decreased pelvic/trunk rotation    FUNCTIONAL MOVEMENT PATTERNS  Movement Analysis Notes   Bed Mobility  []Functional  [x]Dysfunctional:  [x]Painful  []Non-Painful    Poor motor planning, decreased core strength, guarded movements    Transfers []Functional  [x]Dysfunctional:  [x]Painful  []Non-Painful    Poor motor planning, decreased core strength, guarded movements   Sit to Stand []Functional  [x]Dysfunctional:  [x]Painful  []Non-Painful    Poor motor planning, decreased core strength, guarded movements   Squat []Functional  []Dysfunctional:  []Painful  []Non-Painful    NT         Function:     Intake Outcome Measure for FOTO Foot Survey    Therapist reviewed FOTO scores for Yolanda on 2/9/2024.   FOTO report - see Media section or FOTO account for episode details    Intake Score: 10%         Treatment     Total Treatment time (time-based codes) separate from Evaluation: (15) minutes     Yolanda received the treatments listed below:        THERAPEUTIC EXERCISES to develop strength, endurance, ROM, flexibility, posture, and core stabilization for (15) minutes including:    Intervention Performed Today    Patient Education and HEP established and reviewed x See Patient Instructions for details. Discussed how PT exercises can help with diagnosis, prognosis, etc.                                         Plan for Next Visit:        Patient Education and Home Exercises     Education provided: (included in treatment section) minutes  PURPOSE: Patient educated on the impairments noted above and the effects of physical therapy intervention to improve overall condition and QOL.   EXERCISE: Patient was educated on all the above exercise prior/during/after for proper posture, positioning, and execution for safe performance with home exercise program.   STRENGTH: Patient educated on the importance of improved core and extremity strength in order to improve alignment of the spine and extremities with static positions and dynamic movement.   GAIT & BALANCE: Patient educated on the importance of strong core and lower extremity musculature in order to improve both static and dynamic balance, improve gait mechanics, reduce fall risk and improve household and  community mobility.   POSTURE: Patient educated on postural awareness to reduce stress and maintain optimal alignment of the spine with static positions and dynamic movement     Written Home Exercises Provided: yes.  Exercises were reviewed and Yolanda was able to demonstrate them prior to the end of the session.  Yolanda demonstrated good  understanding of the education provided. See EMR under Patient Instructions for exercises provided during therapy sessions.    Assessment     Yolanda is a 53 y.o. female referred to outpatient Physical Therapy with a medical diagnosis of bilateral plantar fasciitis. Pt presents with impairments in the following categories: IMPAIRMENTS: ROM, strength, endurance, muscle length, posture, gait mechanics, core strength and stability, and functional movement patterns    Pt prognosis is Good  Pt will benefit from skilled outpatient Physical Therapy to address the deficits stated above and in the chart below, provide pt/family education, and to maximize pt's level of independence.     Plan of care discussed with patient: Yes  Pt's spiritual, cultural and educational needs considered and patient is agreeable to the plan of care and goals as stated below:     Anticipated Barriers for therapy: co-morbidities    Medical Necessity is demonstrated by the following  History  Co-morbidities and personal factors that may impact the plan of care [] LOW: no personal factors / co-morbidities  [x] MODERATE: 1-2 personal factors / co-morbidities  [] HIGH: 3+ personal factors / co-morbidities    Moderate / High Support Documentation:   Past Medical History:   Diagnosis Date    Abnormal Pap smear of cervix     HPV genital warts    Anemia     Anxiety     Arthritis     Asthma 10/11/2016    Bipolar 1 disorder     Diabetes mellitus, type 2     Dyslipidemia associated with type 2 diabetes mellitus 05/13/2019    General anesthetics causing adverse effect in therapeutic use     Genital warts     GERD (gastroesophageal  "reflux disease)     Herpes simplex virus (HSV) infection     Hyperlipidemia     Hypertension     Hypertension complicating diabetes 05/05/2019    Migraine with aura and without status migrainosus, not intractable 03/21/2016    Mild persistent asthma without complication 10/11/2016    Morbid obesity with body mass index (BMI) of 45.0 to 49.9 in adult 08/03/2017    Obstructive sleep apnea     ALEN (obstructive sleep apnea)     2L per N/C q HS    Schizoaffective disorder, bipolar type 04/25/2019    Seasonal allergic rhinitis due to pollen 05/13/2019    Type 2 diabetes mellitus with hyperglycemia, with long-term current use of insulin 12/21/2017    Type 2 diabetes mellitus with hyperglycemia, without long-term current use of insulin 12/21/2017        Examination  Body Structures and Functions, activity limitations and participation restrictions that may impact the plan of care [] LOW: addressing 1-2 elements  [x] MODERATE: 3+ elements  [] HIGH: 4+ elements (please support below)    Moderate / High Support Documentation: See above in "Current Level of Function"      Clinical Presentation [] LOW: stable  [x] MODERATE: Evolving  [] HIGH: Unstable     Decision Making/ Complexity Score: moderate         Short Term Goals:  6 weeks Status  Date Met   PAIN: Pt will report worst pain of 5/10 in order to progress toward max functional ability and improve quality of life. [x] Progressing  [] Met  [] Not Met    FUNCTION: Patient will demonstrate improved function as indicated by a score of greater than or equal to 22 out of 100 on FOTO. [x] Progressing  [] Met  [] Not Met    STRENGTH: Patient will improve strength to 50% of stated goals, listed in objective measures above, in order to progress towards independence with functional activities. [x] Progressing  [] Met  [] Not Met    POSTURE: Patient will correct postural deviations in sitting and standing, to decrease pain and promote long term stability.  [x] Progressing  [] Met  [] " Not Met    GAIT: Patient will demonstrate improved gait mechanics in order to improve functional mobility, improve quality of life, and decrease risk of further injury or fall.  [x] Progressing  [] Met  [] Not Met    HEP: Patient will demonstrate independence with HEP in order to progress toward functional independence. [x] Progressing  [] Met  [] Not Met      Long Term Goals:  12 weeks Status Date Met   PAIN: Pt will report worst pain of 3/10 in order to progress toward max functional ability and improve quality of life [x] Progressing  [] Met  [] Not Met    FUNCTION: Patient will demonstrate improved function as indicated by a score of greater than or equal to 34 out of 100 on FOTO. [x] Progressing  [] Met  [] Not Met    MOBILITY: Patient will improve AROM to stated goals, listed in objective measures above, in order to return to maximal functional potential and improve quality of life.  [x] Progressing  [] Met  [] Not Met    STRENGTH: Patient will improve strength to stated goals, listed in objective measures above, in order to improve functional independence and quality of life.  [x] Progressing  [] Met  [] Not Met    GAIT: Patient will demonstrate normalized gait mechanics with minimal compensation in order to return to PLOF. [x] Progressing  [] Met  [] Not Met    Patient will return to normal ADL's, IADL's, community involvement, recreational activities, and work-related activities with less than or equal to 1/10 pain and maximal function.  [x] Progressing  [] Met  [] Not Met      Plan     Plan of care Certification: 2/9/2024 to 5/9/2024.    Outpatient Physical Therapy 2 times weekly for 12 weeks to include any combination of the following interventions: virtual visits, dry needling, modalities, electrical stimulation (IFC, Pre-Mod, Attended with Functional Dry Needling), Aquatic Therapy, Cervical/Lumbar Traction, Gait Training, Manual Therapy, Neuromuscular Re-ed, Patient Education, Self Care, Therapeutic  Exercise, and Therapeutic Activites     Winifred Blanco, PT, DPT